# Patient Record
Sex: MALE | Race: WHITE | NOT HISPANIC OR LATINO | Employment: FULL TIME | URBAN - METROPOLITAN AREA
[De-identification: names, ages, dates, MRNs, and addresses within clinical notes are randomized per-mention and may not be internally consistent; named-entity substitution may affect disease eponyms.]

---

## 2023-01-17 ENCOUNTER — TRANSCRIBE ORDERS (OUTPATIENT)
Dept: ADMINISTRATIVE | Facility: HOSPITAL | Age: 58
End: 2023-01-17
Payer: COMMERCIAL

## 2023-01-17 DIAGNOSIS — I35.0 SEVERE AORTIC STENOSIS: Primary | ICD-10-CM

## 2023-01-19 ENCOUNTER — TRANSCRIBE ORDERS (OUTPATIENT)
Dept: ADMINISTRATIVE | Facility: HOSPITAL | Age: 58
End: 2023-01-19
Payer: COMMERCIAL

## 2023-01-19 DIAGNOSIS — I35.0 NODULAR CALCIFIC AORTIC VALVE STENOSIS: Primary | ICD-10-CM

## 2023-01-26 ENCOUNTER — HOSPITAL ENCOUNTER (OUTPATIENT)
Facility: HOSPITAL | Age: 58
Setting detail: HOSPITAL OUTPATIENT SURGERY
Discharge: HOME | End: 2023-01-26
Attending: INTERNAL MEDICINE | Admitting: INTERNAL MEDICINE
Payer: COMMERCIAL

## 2023-01-26 VITALS
OXYGEN SATURATION: 94 % | HEART RATE: 79 BPM | WEIGHT: 287 LBS | BODY MASS INDEX: 36.83 KG/M2 | RESPIRATION RATE: 18 BRPM | TEMPERATURE: 97.5 F | DIASTOLIC BLOOD PRESSURE: 90 MMHG | HEIGHT: 74 IN | SYSTOLIC BLOOD PRESSURE: 130 MMHG

## 2023-01-26 DIAGNOSIS — I35.0 AORTIC STENOSIS, SEVERE: Primary | ICD-10-CM

## 2023-01-26 DIAGNOSIS — I35.0 NODULAR CALCIFIC AORTIC VALVE STENOSIS: ICD-10-CM

## 2023-01-26 LAB
ANION GAP SERPL CALCULATED.3IONS-SCNC: 12 MMOL/L (ref 5–15)
BUN BLDA-MCNC: 22 MG/DL (ref 8–26)
BUN SERPL-MCNC: 22 MG/DL (ref 6–20)
BUN/CREAT SERPL: 29.3 (ref 7–25)
CA-I BLDA-SCNC: 1.23 MMOL/L (ref 1.2–1.32)
CALCIUM SPEC-SCNC: 9.2 MG/DL (ref 8.6–10.5)
CHLORIDE BLDA-SCNC: 101 MMOL/L (ref 98–109)
CHLORIDE SERPL-SCNC: 100 MMOL/L (ref 98–107)
CHOLEST SERPL-MCNC: 139 MG/DL (ref 0–200)
CO2 BLDA-SCNC: 26 MMOL/L (ref 24–29)
CO2 SERPL-SCNC: 25 MMOL/L (ref 22–29)
CREAT BLDA-MCNC: 0.8 MG/DL (ref 0.6–1.3)
CREAT SERPL-MCNC: 0.75 MG/DL (ref 0.76–1.27)
DEPRECATED RDW RBC AUTO: 49.3 FL (ref 37–54)
EGFRCR SERPLBLD CKD-EPI 2021: 102.6 ML/MIN/1.73
EGFRCR SERPLBLD CKD-EPI 2021: 104.6 ML/MIN/1.73
ERYTHROCYTE [DISTWIDTH] IN BLOOD BY AUTOMATED COUNT: 14.8 % (ref 12.3–15.4)
GLUCOSE BLDC GLUCOMTR-MCNC: 110 MG/DL (ref 70–130)
GLUCOSE SERPL-MCNC: 106 MG/DL (ref 65–99)
HBA1C MFR BLD: 6.1 % (ref 4.8–5.6)
HCT VFR BLD AUTO: 43 % (ref 37.5–51)
HCT VFR BLDA CALC: 45 % (ref 38–51)
HDLC SERPL-MCNC: 55 MG/DL (ref 40–60)
HGB BLD-MCNC: 14.2 G/DL (ref 13–17.7)
HGB BLDA-MCNC: 15.3 G/DL (ref 12–17)
LDLC SERPL CALC-MCNC: 73 MG/DL (ref 0–100)
LDLC/HDLC SERPL: 1.34 {RATIO}
MCH RBC QN AUTO: 30 PG (ref 26.6–33)
MCHC RBC AUTO-ENTMCNC: 33 G/DL (ref 31.5–35.7)
MCV RBC AUTO: 90.7 FL (ref 79–97)
PLATELET # BLD AUTO: 199 10*3/MM3 (ref 140–450)
PMV BLD AUTO: 9.2 FL (ref 6–12)
POTASSIUM BLDA-SCNC: 4 MMOL/L (ref 3.5–4.9)
POTASSIUM SERPL-SCNC: 4.1 MMOL/L (ref 3.5–5.2)
RBC # BLD AUTO: 4.74 10*6/MM3 (ref 4.14–5.8)
SODIUM BLD-SCNC: 138 MMOL/L (ref 138–146)
SODIUM SERPL-SCNC: 137 MMOL/L (ref 136–145)
TRIGL SERPL-MCNC: 51 MG/DL (ref 0–150)
VLDLC SERPL-MCNC: 11 MG/DL (ref 5–40)
WBC NRBC COR # BLD: 15.45 10*3/MM3 (ref 3.4–10.8)

## 2023-01-26 PROCEDURE — C1769 GUIDE WIRE: HCPCS | Performed by: INTERNAL MEDICINE

## 2023-01-26 PROCEDURE — 25010000002 FENTANYL CITRATE (PF) 50 MCG/ML SOLUTION: Performed by: INTERNAL MEDICINE

## 2023-01-26 PROCEDURE — 85027 COMPLETE CBC AUTOMATED: CPT | Performed by: INTERNAL MEDICINE

## 2023-01-26 PROCEDURE — 80048 BASIC METABOLIC PNL TOTAL CA: CPT | Performed by: INTERNAL MEDICINE

## 2023-01-26 PROCEDURE — 83036 HEMOGLOBIN GLYCOSYLATED A1C: CPT | Performed by: PHYSICIAN ASSISTANT

## 2023-01-26 PROCEDURE — 80047 BASIC METABLC PNL IONIZED CA: CPT

## 2023-01-26 PROCEDURE — 25010000002 HEPARIN (PORCINE) PER 1000 UNITS: Performed by: INTERNAL MEDICINE

## 2023-01-26 PROCEDURE — 80061 LIPID PANEL: CPT | Performed by: PHYSICIAN ASSISTANT

## 2023-01-26 PROCEDURE — 25010000002 MIDAZOLAM PER 1 MG: Performed by: INTERNAL MEDICINE

## 2023-01-26 PROCEDURE — 36415 COLL VENOUS BLD VENIPUNCTURE: CPT

## 2023-01-26 PROCEDURE — C1894 INTRO/SHEATH, NON-LASER: HCPCS | Performed by: INTERNAL MEDICINE

## 2023-01-26 PROCEDURE — 0 IOPAMIDOL PER 1 ML: Performed by: INTERNAL MEDICINE

## 2023-01-26 PROCEDURE — 85014 HEMATOCRIT: CPT

## 2023-01-26 PROCEDURE — 93454 CORONARY ARTERY ANGIO S&I: CPT | Performed by: INTERNAL MEDICINE

## 2023-01-26 RX ORDER — LIDOCAINE HYDROCHLORIDE 10 MG/ML
INJECTION, SOLUTION EPIDURAL; INFILTRATION; INTRACAUDAL; PERINEURAL
Status: DISCONTINUED | OUTPATIENT
Start: 2023-01-26 | End: 2023-01-26 | Stop reason: HOSPADM

## 2023-01-26 RX ORDER — MIDAZOLAM HYDROCHLORIDE 1 MG/ML
INJECTION INTRAMUSCULAR; INTRAVENOUS
Status: DISCONTINUED | OUTPATIENT
Start: 2023-01-26 | End: 2023-01-26 | Stop reason: HOSPADM

## 2023-01-26 RX ORDER — BISOPROLOL FUMARATE 5 MG/1
2.5 TABLET, FILM COATED ORAL NIGHTLY
COMMUNITY

## 2023-01-26 RX ORDER — NALOXONE HCL 0.4 MG/ML
0.4 VIAL (ML) INJECTION
Status: DISCONTINUED | OUTPATIENT
Start: 2023-01-26 | End: 2023-01-26 | Stop reason: HOSPADM

## 2023-01-26 RX ORDER — HYDROCODONE BITARTRATE AND ACETAMINOPHEN 5; 325 MG/1; MG/1
1 TABLET ORAL EVERY 4 HOURS PRN
Status: DISCONTINUED | OUTPATIENT
Start: 2023-01-26 | End: 2023-01-26 | Stop reason: HOSPADM

## 2023-01-26 RX ORDER — NICARDIPINE HCL-0.9% SOD CHLOR 1 MG/10 ML
SYRINGE (ML) INTRAVENOUS
Status: DISCONTINUED | OUTPATIENT
Start: 2023-01-26 | End: 2023-01-26 | Stop reason: HOSPADM

## 2023-01-26 RX ORDER — FUROSEMIDE 20 MG/1
20 TABLET ORAL DAILY
COMMUNITY

## 2023-01-26 RX ORDER — AMLODIPINE BESYLATE 5 MG/1
5 TABLET ORAL DAILY
COMMUNITY

## 2023-01-26 RX ORDER — SODIUM CHLORIDE 9 MG/ML
250 INJECTION, SOLUTION INTRAVENOUS CONTINUOUS
Status: ACTIVE | OUTPATIENT
Start: 2023-01-26 | End: 2023-01-26

## 2023-01-26 RX ORDER — HEPARIN SODIUM 1000 [USP'U]/ML
INJECTION, SOLUTION INTRAVENOUS; SUBCUTANEOUS
Status: DISCONTINUED | OUTPATIENT
Start: 2023-01-26 | End: 2023-01-26 | Stop reason: HOSPADM

## 2023-01-26 RX ORDER — ASPIRIN 81 MG/1
81 TABLET ORAL DAILY
COMMUNITY
End: 2023-03-15

## 2023-01-26 RX ORDER — TEMAZEPAM 7.5 MG/1
7.5 CAPSULE ORAL NIGHTLY PRN
Status: DISCONTINUED | OUTPATIENT
Start: 2023-01-26 | End: 2023-01-26 | Stop reason: HOSPADM

## 2023-01-26 RX ORDER — FENTANYL CITRATE 50 UG/ML
INJECTION, SOLUTION INTRAMUSCULAR; INTRAVENOUS
Status: DISCONTINUED | OUTPATIENT
Start: 2023-01-26 | End: 2023-01-26 | Stop reason: HOSPADM

## 2023-01-26 RX ORDER — VALSARTAN 80 MG/1
80 TABLET ORAL DAILY
COMMUNITY

## 2023-01-26 RX ORDER — ALPRAZOLAM 0.25 MG/1
0.25 TABLET ORAL 3 TIMES DAILY PRN
Status: DISCONTINUED | OUTPATIENT
Start: 2023-01-26 | End: 2023-01-26 | Stop reason: HOSPADM

## 2023-01-26 RX ORDER — LORATADINE 10 MG/1
10 TABLET ORAL DAILY
COMMUNITY

## 2023-01-26 RX ORDER — ACETAMINOPHEN 325 MG/1
650 TABLET ORAL EVERY 4 HOURS PRN
Status: DISCONTINUED | OUTPATIENT
Start: 2023-01-26 | End: 2023-01-26 | Stop reason: HOSPADM

## 2023-01-26 RX ORDER — ATORVASTATIN CALCIUM 40 MG/1
40 TABLET, FILM COATED ORAL NIGHTLY
COMMUNITY
End: 2023-02-24 | Stop reason: HOSPADM

## 2023-01-26 RX ORDER — MORPHINE SULFATE 2 MG/ML
1 INJECTION, SOLUTION INTRAMUSCULAR; INTRAVENOUS EVERY 4 HOURS PRN
Status: DISCONTINUED | OUTPATIENT
Start: 2023-01-26 | End: 2023-01-26 | Stop reason: HOSPADM

## 2023-01-26 RX ORDER — DEXAMETHASONE 4 MG/1
4 TABLET ORAL 2 TIMES DAILY WITH MEALS
COMMUNITY
End: 2023-02-13

## 2023-01-26 NOTE — PROGRESS NOTES
"Referral received from Dr. Guan for TAVR workup. Spoke with patient and spouse at bedside in [facility] after C. He reports recent PNA and pleural effusion causing SOA, does not reqire and supplemental oxygen. Intermittent chest fullness/pressure and he's noticed a reduction in endurance at work. He works at the [employer] and averages 300,000 steps a month, but has noticed he 'just can't keep up' anymore. Discussed severe AS, aortic valve replacement options-TAVR vs. SAVR, and  pre-procedural testing and consult requirements. He will need a dental checkup and is aware of the importance of this.     Will order CTS consult with Dr. Barger per Dr. Guan's request. He has already ordered a TEODORO Will also order TAVR CTA and carotid duplex.     Educational materials provided at bedside, which includes my contact information for any questions or concerns.       TAVR Pre-Op Checklist    Patient Name: John Wolfe   57 y.o. [id number]  Residence: [place], [place]    Referral Date: 23 : 1965   Ht:74\" Wt: 287lb     Referring Cardiologist: Dr. Guan  BMI: 36.85    Initial TTE date: 23 EXTERNAL MEDICAL RECORDS - SCAN - NOTES,ECHO/ ROMA HEART SPEC/ - (2023)    DAWIT: 0.9cm AV mean: 41mm AV Vmax: 4.2m/s LVEF: 60-65%    CT Surgery Consult:P      STS Score: P     Allergy (Contrast):Ceclor [cefaclor]   Pre-op meds (contrast): N     Anticoagulated: No Last dose: NA Heparin or Lovenox Bridge: NA    CTA Date: P     CTA 3D: P    Left Coronary Ht: P  Right Coronary Ht: P  Annulus Area: P    CTA Important findings: P    Cardiac cath: H&P by Edwin Guan MD (2023 07:11)    Physician: Dr. Guan       TEODORO: P        Physician: Dr. Mccracken   TEODORO annulus: P  Projected TAVR Prosthesis Size:     Carotid duplex: P      Dobutamine GXT:NA      EKG rhythm: P  NSR with RBBB   PPM/ Last download :NA    PFT (FEV1):P    Important meds: ASA    PAT lab review:  BNP:P BUN/Creatinine: P H&H:P       " PLTS:P    P2Y12:P HGA1C: 6.1% CXR:P      TAVR Procedure Date: P

## 2023-01-30 ENCOUNTER — PREP FOR SURGERY (OUTPATIENT)
Dept: OTHER | Facility: HOSPITAL | Age: 58
End: 2023-01-30
Payer: COMMERCIAL

## 2023-02-01 ENCOUNTER — HOSPITAL ENCOUNTER (OUTPATIENT)
Dept: CARDIOLOGY | Facility: HOSPITAL | Age: 58
Discharge: HOME | End: 2023-02-01
Admitting: INTERNAL MEDICINE
Payer: COMMERCIAL

## 2023-02-01 VITALS
BODY MASS INDEX: 36.78 KG/M2 | HEIGHT: 74 IN | SYSTOLIC BLOOD PRESSURE: 115 MMHG | HEART RATE: 61 BPM | WEIGHT: 286.6 LBS | DIASTOLIC BLOOD PRESSURE: 70 MMHG | OXYGEN SATURATION: 94 % | RESPIRATION RATE: 18 BRPM

## 2023-02-01 DIAGNOSIS — I35.0 SEVERE AORTIC STENOSIS: ICD-10-CM

## 2023-02-01 LAB
BH CV ECHO MEAS - AO MAX PG: 50.7 MMHG
BH CV ECHO MEAS - AO MEAN PG: 33 MMHG
BH CV ECHO MEAS - AO V2 MAX: 356 CM/SEC
BH CV ECHO MEAS - AO V2 VTI: 78.3 CM
BH CV ECHO MEAS - LVOT AREA: 3.5 CM2
BH CV ECHO MEAS - LVOT DIAM: 2.1 CM
BH CV VAS BP RIGHT ARM: NORMAL MMHG
LV EF 2D ECHO EST: 60 %
MAXIMAL PREDICTED HEART RATE: 162 BPM
STRESS TARGET HR: 138 BPM

## 2023-02-01 PROCEDURE — 93321 DOPPLER ECHO F-UP/LMTD STD: CPT | Performed by: INTERNAL MEDICINE

## 2023-02-01 PROCEDURE — 93312 ECHO TRANSESOPHAGEAL: CPT | Performed by: INTERNAL MEDICINE

## 2023-02-01 PROCEDURE — 93325 DOPPLER ECHO COLOR FLOW MAPG: CPT | Performed by: INTERNAL MEDICINE

## 2023-02-01 PROCEDURE — 25010000002 MIDAZOLAM PER 1 MG: Performed by: INTERNAL MEDICINE

## 2023-02-01 PROCEDURE — 93312 ECHO TRANSESOPHAGEAL: CPT

## 2023-02-01 PROCEDURE — 93321 DOPPLER ECHO F-UP/LMTD STD: CPT

## 2023-02-01 PROCEDURE — 93325 DOPPLER ECHO COLOR FLOW MAPG: CPT

## 2023-02-01 RX ORDER — POTASSIUM CHLORIDE 750 MG/1
1 CAPSULE, EXTENDED RELEASE ORAL DAILY
COMMUNITY
Start: 2023-01-11 | End: 2023-03-15

## 2023-02-01 RX ORDER — MIDAZOLAM HYDROCHLORIDE 1 MG/ML
INJECTION INTRAMUSCULAR; INTRAVENOUS
Status: COMPLETED | OUTPATIENT
Start: 2023-02-01 | End: 2023-02-01

## 2023-02-01 RX ADMIN — MIDAZOLAM HYDROCHLORIDE 2 MG: 1 INJECTION, SOLUTION INTRAMUSCULAR; INTRAVENOUS at 09:18

## 2023-02-01 RX ADMIN — MIDAZOLAM HYDROCHLORIDE 2 MG: 1 INJECTION, SOLUTION INTRAMUSCULAR; INTRAVENOUS at 09:14

## 2023-02-01 NOTE — INTERVAL H&P NOTE
H&P reviewed. The patient was examined and there are no changes to the H&P.      Patient with severe aortic stenosis on transthoracic echocardiogram presents today for TEODORO as per the work-up for consideration of TAVR.  The risks, benefits, and alternatives were discussed with the patient he wishes to proceed.    Nikia Argueta PA-C

## 2023-02-03 ENCOUNTER — HOSPITAL ENCOUNTER (OUTPATIENT)
Dept: CARDIOLOGY | Facility: HOSPITAL | Age: 58
Discharge: HOME | End: 2023-02-03
Admitting: INTERNAL MEDICINE
Payer: COMMERCIAL

## 2023-02-03 VITALS — BODY MASS INDEX: 36.78 KG/M2 | HEIGHT: 74 IN | WEIGHT: 286.6 LBS

## 2023-02-03 DIAGNOSIS — I35.0 AORTIC STENOSIS, SEVERE: ICD-10-CM

## 2023-02-03 PROCEDURE — 93880 EXTRACRANIAL BILAT STUDY: CPT

## 2023-02-06 LAB
BH CV XLRA MEAS LEFT DIST CCA EDV: 20.9 CM/SEC
BH CV XLRA MEAS LEFT DIST CCA PSV: 71.9 CM/SEC
BH CV XLRA MEAS LEFT DIST ICA EDV: 15.7 CM/SEC
BH CV XLRA MEAS LEFT DIST ICA PSV: 50.1 CM/SEC
BH CV XLRA MEAS LEFT ICA/CCA RATIO: 1.06
BH CV XLRA MEAS LEFT MID CCA EDV: 13.7 CM/SEC
BH CV XLRA MEAS LEFT MID CCA PSV: 72.4 CM/SEC
BH CV XLRA MEAS LEFT MID ICA EDV: 35.4 CM/SEC
BH CV XLRA MEAS LEFT MID ICA PSV: 77 CM/SEC
BH CV XLRA MEAS LEFT PROX CCA EDV: 21.4 CM/SEC
BH CV XLRA MEAS LEFT PROX CCA PSV: 92.7 CM/SEC
BH CV XLRA MEAS LEFT PROX ECA EDV: 36.8 CM/SEC
BH CV XLRA MEAS LEFT PROX ECA PSV: 137 CM/SEC
BH CV XLRA MEAS LEFT PROX ICA EDV: 24.1 CM/SEC
BH CV XLRA MEAS LEFT PROX ICA PSV: 48.7 CM/SEC
BH CV XLRA MEAS LEFT PROX SCLA PSV: 175.3 CM/SEC
BH CV XLRA MEAS RIGHT DIST CCA EDV: 19.2 CM/SEC
BH CV XLRA MEAS RIGHT DIST CCA PSV: 79 CM/SEC
BH CV XLRA MEAS RIGHT DIST ICA EDV: 28 CM/SEC
BH CV XLRA MEAS RIGHT DIST ICA PSV: 67.5 CM/SEC
BH CV XLRA MEAS RIGHT ICA/CCA RATIO: 0.8
BH CV XLRA MEAS RIGHT MID CCA EDV: 27.4 CM/SEC
BH CV XLRA MEAS RIGHT MID CCA PSV: 83.9 CM/SEC
BH CV XLRA MEAS RIGHT MID ICA EDV: 22.5 CM/SEC
BH CV XLRA MEAS RIGHT MID ICA PSV: 54.9 CM/SEC
BH CV XLRA MEAS RIGHT PROX CCA EDV: 10.2 CM/SEC
BH CV XLRA MEAS RIGHT PROX CCA PSV: 63.5 CM/SEC
BH CV XLRA MEAS RIGHT PROX ECA EDV: 15.4 CM/SEC
BH CV XLRA MEAS RIGHT PROX ECA PSV: 53.8 CM/SEC
BH CV XLRA MEAS RIGHT PROX ICA EDV: 16.5 CM/SEC
BH CV XLRA MEAS RIGHT PROX ICA PSV: 62.6 CM/SEC
BH CV XLRA MEAS RIGHT PROX SCLA PSV: 87 CM/SEC
BH CV XLRA MEAS RIGHT VERTEBRAL A EDV: 15.9 CM/SEC
BH CV XLRA MEAS RIGHT VERTEBRAL A PSV: 48.8 CM/SEC
LEFT ARM BP: NORMAL MMHG
MAXIMAL PREDICTED HEART RATE: 162 BPM
RIGHT ARM BP: NORMAL MMHG
STRESS TARGET HR: 138 BPM

## 2023-02-13 ENCOUNTER — HOSPITAL ENCOUNTER (OUTPATIENT)
Dept: CT IMAGING | Facility: HOSPITAL | Age: 58
Discharge: HOME | End: 2023-02-13
Admitting: INTERNAL MEDICINE
Payer: COMMERCIAL

## 2023-02-13 VITALS
SYSTOLIC BLOOD PRESSURE: 133 MMHG | OXYGEN SATURATION: 94 % | DIASTOLIC BLOOD PRESSURE: 91 MMHG | HEIGHT: 74 IN | TEMPERATURE: 97.2 F | BODY MASS INDEX: 37.86 KG/M2 | HEART RATE: 83 BPM | WEIGHT: 295 LBS

## 2023-02-13 DIAGNOSIS — I35.0 NODULAR CALCIFIC AORTIC VALVE STENOSIS: Primary | ICD-10-CM

## 2023-02-13 DIAGNOSIS — I35.0 AORTIC STENOSIS, SEVERE: ICD-10-CM

## 2023-02-13 PROCEDURE — 74174 CTA ABD&PLVS W/CONTRAST: CPT

## 2023-02-13 PROCEDURE — 0 IOPAMIDOL PER 1 ML: Performed by: INTERNAL MEDICINE

## 2023-02-13 PROCEDURE — 71275 CT ANGIOGRAPHY CHEST: CPT

## 2023-02-13 RX ORDER — SODIUM CHLORIDE 0.9 % (FLUSH) 0.9 %
10 SYRINGE (ML) INJECTION AS NEEDED
Status: DISCONTINUED | OUTPATIENT
Start: 2023-02-13 | End: 2023-02-14 | Stop reason: HOSPADM

## 2023-02-13 RX ORDER — METOPROLOL TARTRATE 50 MG/1
50 TABLET, FILM COATED ORAL ONCE AS NEEDED
Status: COMPLETED | OUTPATIENT
Start: 2023-02-13 | End: 2023-02-13

## 2023-02-13 RX ORDER — SODIUM CHLORIDE 0.9 % (FLUSH) 0.9 %
3 SYRINGE (ML) INJECTION EVERY 12 HOURS SCHEDULED
Status: DISCONTINUED | OUTPATIENT
Start: 2023-02-13 | End: 2023-02-14 | Stop reason: HOSPADM

## 2023-02-13 RX ORDER — METOPROLOL TARTRATE 50 MG/1
100 TABLET, FILM COATED ORAL ONCE AS NEEDED
Status: COMPLETED | OUTPATIENT
Start: 2023-02-13 | End: 2023-02-13

## 2023-02-13 RX ADMIN — METOPROLOL TARTRATE 100 MG: 50 TABLET, FILM COATED ORAL at 12:14

## 2023-02-13 RX ADMIN — IOPAMIDOL 100 ML: 755 INJECTION, SOLUTION INTRAVENOUS at 13:24

## 2023-02-13 NOTE — PROGRESS NOTES
Referral received from Dr. Barger for aortic stenosis. Currently, he does not meet criteria for SAVR or TAVR. DAWIT: 1.0cm2, Vmax 283cm/sec, mean 19mmHg. He does have symptoms of chest discomfort with exertion, leg fatigue and dizziness. He denies syncope. He does have a stressful/demanding job and reports blood pressure as high as 170/100 at times. He was taken off of his Lisinopril, but does take his amlodipine and HCTZ. Does not take nitroglycerin for chest discomfort. Discussed aortic stenosis, bicuspid valve, s/s, when to go to ED and/or contact local cardiology providers and future valve replacement options. Referral placed for patient to see Dr. Mccracken, per Dr. Barger. Educational materials provided and I have updated Dr. Victor and Silvia Charles, APRN.

## 2023-02-14 PROBLEM — E11.9 DM (DIABETES MELLITUS), TYPE 2: Status: ACTIVE | Noted: 2023-02-14

## 2023-02-14 PROBLEM — E78.5 HYPERLIPIDEMIA: Status: ACTIVE | Noted: 2023-02-14

## 2023-02-14 PROBLEM — I10 ESSENTIAL HYPERTENSION: Status: ACTIVE | Noted: 2023-02-14

## 2023-02-14 PROBLEM — I51.89 GRADE II DIASTOLIC DYSFUNCTION: Status: ACTIVE | Noted: 2023-02-14

## 2023-02-16 ENCOUNTER — PREP FOR SURGERY (OUTPATIENT)
Dept: OTHER | Facility: HOSPITAL | Age: 58
End: 2023-02-16
Payer: COMMERCIAL

## 2023-02-16 DIAGNOSIS — I35.9 AORTIC VALVE DISORDER: Primary | ICD-10-CM

## 2023-02-20 ENCOUNTER — OFFICE VISIT (OUTPATIENT)
Dept: CARDIAC SURGERY | Facility: CLINIC | Age: 58
End: 2023-02-20
Payer: COMMERCIAL

## 2023-02-20 ENCOUNTER — DOCUMENTATION (OUTPATIENT)
Dept: CARDIOLOGY | Facility: HOSPITAL | Age: 58
End: 2023-02-20
Payer: COMMERCIAL

## 2023-02-20 ENCOUNTER — HOSPITAL ENCOUNTER (OUTPATIENT)
Dept: GENERAL RADIOLOGY | Facility: HOSPITAL | Age: 58
Discharge: HOME | DRG: 267 | End: 2023-02-20
Payer: COMMERCIAL

## 2023-02-20 ENCOUNTER — PRE-ADMISSION TESTING (OUTPATIENT)
Dept: PREADMISSION TESTING | Facility: HOSPITAL | Age: 58
DRG: 267 | End: 2023-02-20
Payer: COMMERCIAL

## 2023-02-20 ENCOUNTER — HOSPITAL ENCOUNTER (OUTPATIENT)
Dept: PULMONOLOGY | Facility: HOSPITAL | Age: 58
Discharge: HOME | DRG: 267 | End: 2023-02-20
Payer: COMMERCIAL

## 2023-02-20 VITALS
DIASTOLIC BLOOD PRESSURE: 90 MMHG | TEMPERATURE: 97.8 F | BODY MASS INDEX: 37.99 KG/M2 | HEIGHT: 74 IN | SYSTOLIC BLOOD PRESSURE: 120 MMHG | WEIGHT: 296 LBS | OXYGEN SATURATION: 92 % | HEART RATE: 84 BPM

## 2023-02-20 VITALS — HEIGHT: 74 IN | BODY MASS INDEX: 38.03 KG/M2 | WEIGHT: 296.3 LBS

## 2023-02-20 DIAGNOSIS — I35.9 AORTIC VALVE DISORDER: ICD-10-CM

## 2023-02-20 DIAGNOSIS — I35.9 AORTIC VALVE DISORDER: Primary | ICD-10-CM

## 2023-02-20 LAB
ABO GROUP BLD: NORMAL
ALBUMIN SERPL-MCNC: 4.5 G/DL (ref 3.5–5.2)
ALBUMIN/GLOB SERPL: 1.4 G/DL
ALP SERPL-CCNC: 51 U/L (ref 39–117)
ALT SERPL W P-5'-P-CCNC: 52 U/L (ref 1–41)
AMPHET+METHAMPHET UR QL: NEGATIVE
AMPHETAMINES UR QL: NEGATIVE
ANION GAP SERPL CALCULATED.3IONS-SCNC: 12 MMOL/L (ref 5–15)
APTT PPP: 30.7 SECONDS (ref 22–39)
AST SERPL-CCNC: 33 U/L (ref 1–40)
BARBITURATES UR QL SCN: NEGATIVE
BASOPHILS # BLD AUTO: 0.06 10*3/MM3 (ref 0–0.2)
BASOPHILS NFR BLD AUTO: 0.8 % (ref 0–1.5)
BENZODIAZ UR QL SCN: NEGATIVE
BILIRUB SERPL-MCNC: 0.3 MG/DL (ref 0–1.2)
BLD GP AB SCN SERPL QL: NEGATIVE
BUN SERPL-MCNC: 16 MG/DL (ref 6–20)
BUN/CREAT SERPL: 18.8 (ref 7–25)
BUPRENORPHINE SERPL-MCNC: NEGATIVE NG/ML
CALCIUM SPEC-SCNC: 9.5 MG/DL (ref 8.6–10.5)
CANNABINOIDS SERPL QL: NEGATIVE
CHLORIDE SERPL-SCNC: 100 MMOL/L (ref 98–107)
CO2 SERPL-SCNC: 29 MMOL/L (ref 22–29)
COCAINE UR QL: NEGATIVE
CREAT SERPL-MCNC: 0.85 MG/DL (ref 0.76–1.27)
DEPRECATED RDW RBC AUTO: 45.1 FL (ref 37–54)
EGFRCR SERPLBLD CKD-EPI 2021: 100.7 ML/MIN/1.73
EOSINOPHIL # BLD AUTO: 0.32 10*3/MM3 (ref 0–0.4)
EOSINOPHIL NFR BLD AUTO: 4 % (ref 0.3–6.2)
ERYTHROCYTE [DISTWIDTH] IN BLOOD BY AUTOMATED COUNT: 14 % (ref 12.3–15.4)
GLOBULIN UR ELPH-MCNC: 3.2 GM/DL
GLUCOSE SERPL-MCNC: 122 MG/DL (ref 65–99)
HBA1C MFR BLD: 6.4 % (ref 4.8–5.6)
HCT VFR BLD AUTO: 43.4 % (ref 37.5–51)
HGB BLD-MCNC: 14.8 G/DL (ref 13–17.7)
IMM GRANULOCYTES # BLD AUTO: 0.04 10*3/MM3 (ref 0–0.05)
IMM GRANULOCYTES NFR BLD AUTO: 0.5 % (ref 0–0.5)
INR PPP: 1.05 (ref 0.84–1.13)
LYMPHOCYTES # BLD AUTO: 2.3 10*3/MM3 (ref 0.7–3.1)
LYMPHOCYTES NFR BLD AUTO: 28.8 % (ref 19.6–45.3)
MAGNESIUM SERPL-MCNC: 2 MG/DL (ref 1.6–2.6)
MCH RBC QN AUTO: 30.4 PG (ref 26.6–33)
MCHC RBC AUTO-ENTMCNC: 34.1 G/DL (ref 31.5–35.7)
MCV RBC AUTO: 89.1 FL (ref 79–97)
METHADONE UR QL SCN: NEGATIVE
MONOCYTES # BLD AUTO: 0.66 10*3/MM3 (ref 0.1–0.9)
MONOCYTES NFR BLD AUTO: 8.3 % (ref 5–12)
NEUTROPHILS NFR BLD AUTO: 4.6 10*3/MM3 (ref 1.7–7)
NEUTROPHILS NFR BLD AUTO: 57.6 % (ref 42.7–76)
NRBC BLD AUTO-RTO: 0 /100 WBC (ref 0–0.2)
OPIATES UR QL: NEGATIVE
OXYCODONE UR QL SCN: NEGATIVE
PA ADP PRP-ACNC: 183 PRU
PCP UR QL SCN: NEGATIVE
PLATELET # BLD AUTO: 247 10*3/MM3 (ref 140–450)
PMV BLD AUTO: 8.6 FL (ref 6–12)
POTASSIUM SERPL-SCNC: 3.5 MMOL/L (ref 3.5–5.2)
PROPOXYPH UR QL: NEGATIVE
PROT SERPL-MCNC: 7.7 G/DL (ref 6–8.5)
PROTHROMBIN TIME: 13.6 SECONDS (ref 11.4–14.4)
RBC # BLD AUTO: 4.87 10*6/MM3 (ref 4.14–5.8)
RH BLD: POSITIVE
SODIUM SERPL-SCNC: 141 MMOL/L (ref 136–145)
T&S EXPIRATION DATE: NORMAL
TRICYCLICS UR QL SCN: NEGATIVE
WBC NRBC COR # BLD: 7.98 10*3/MM3 (ref 3.4–10.8)

## 2023-02-20 PROCEDURE — 99205 OFFICE O/P NEW HI 60 MIN: CPT | Performed by: THORACIC SURGERY (CARDIOTHORACIC VASCULAR SURGERY)

## 2023-02-20 PROCEDURE — 86900 BLOOD TYPING SEROLOGIC ABO: CPT

## 2023-02-20 PROCEDURE — 71046 X-RAY EXAM CHEST 2 VIEWS: CPT

## 2023-02-20 PROCEDURE — 83036 HEMOGLOBIN GLYCOSYLATED A1C: CPT

## 2023-02-20 PROCEDURE — 80053 COMPREHEN METABOLIC PANEL: CPT

## 2023-02-20 PROCEDURE — 94010 BREATHING CAPACITY TEST: CPT | Performed by: INTERNAL MEDICINE

## 2023-02-20 PROCEDURE — 93005 ELECTROCARDIOGRAM TRACING: CPT

## 2023-02-20 PROCEDURE — 36415 COLL VENOUS BLD VENIPUNCTURE: CPT

## 2023-02-20 PROCEDURE — 93010 ELECTROCARDIOGRAM REPORT: CPT | Performed by: INTERNAL MEDICINE

## 2023-02-20 PROCEDURE — 83735 ASSAY OF MAGNESIUM: CPT

## 2023-02-20 PROCEDURE — 86850 RBC ANTIBODY SCREEN: CPT

## 2023-02-20 PROCEDURE — 85576 BLOOD PLATELET AGGREGATION: CPT

## 2023-02-20 PROCEDURE — 85730 THROMBOPLASTIN TIME PARTIAL: CPT

## 2023-02-20 PROCEDURE — 80306 DRUG TEST PRSMV INSTRMNT: CPT

## 2023-02-20 PROCEDURE — 85610 PROTHROMBIN TIME: CPT

## 2023-02-20 PROCEDURE — 86923 COMPATIBILITY TEST ELECTRIC: CPT

## 2023-02-20 PROCEDURE — 94010 BREATHING CAPACITY TEST: CPT

## 2023-02-20 PROCEDURE — 86901 BLOOD TYPING SEROLOGIC RH(D): CPT

## 2023-02-20 PROCEDURE — 85025 COMPLETE CBC W/AUTO DIFF WBC: CPT

## 2023-02-20 RX ORDER — CHLORHEXIDINE GLUCONATE 500 MG/1
1 CLOTH TOPICAL EVERY 12 HOURS PRN
Status: CANCELLED | OUTPATIENT
Start: 2023-02-20

## 2023-02-20 RX ORDER — NITROGLYCERIN 0.4 MG/1
0.4 TABLET SUBLINGUAL
Status: CANCELLED | OUTPATIENT
Start: 2023-02-20

## 2023-02-20 RX ORDER — ASPIRIN 325 MG
325 TABLET ORAL NIGHTLY
Status: CANCELLED | OUTPATIENT
Start: 2023-02-20 | End: 2023-02-21

## 2023-02-20 RX ORDER — OMEPRAZOLE 20 MG/1
20 CAPSULE, DELAYED RELEASE ORAL DAILY
COMMUNITY
End: 2023-03-15

## 2023-02-20 RX ORDER — CHLORHEXIDINE GLUCONATE 500 MG/1
1 CLOTH TOPICAL EVERY 12 HOURS PRN
Status: ACTIVE | OUTPATIENT
Start: 2023-02-20

## 2023-02-20 RX ORDER — ASPIRIN 325 MG
325 TABLET ORAL NIGHTLY
Status: SHIPPED | OUTPATIENT
Start: 2023-02-20 | End: 2023-02-21

## 2023-02-20 RX ORDER — CHLORHEXIDINE GLUCONATE 0.12 MG/ML
15 RINSE ORAL ONCE
Status: CANCELLED | OUTPATIENT
Start: 2023-02-20 | End: 2023-02-20

## 2023-02-20 NOTE — PROGRESS NOTES
Met with patient after CTS apt for KCCQ, 5 meter walk test and discuss TAVR on 2/23. Has been feeling about the same as when we last discussed his s/s after [facility]. Continued MONTOYA and reduced exercise capacity. Is anticipating TAVR on Thursday so he can return to work and be able to walk 30,000-40,000 steps/day. Discussed TAVR procedure, testing results, overnight hospitalization, risks associated with procedure and follow up expectations. Will follow up after TAVR.      KCCQ: 30/70  5MWT: 5.49s/5 meters

## 2023-02-20 NOTE — PAT
Patient viewed general PAT education video as instructed in their preoperative information received from their surgeon.  Patient stated the general PAT education video was viewed in its entirety and survey completed.  Copies of [facility] general education handouts (Incentive Spirometry, Meds to Beds Program, Patient Belongings, Pre-op skin preparation instructions, Blood Glucose testing, Visitor policy, Surgery FAQ, Code H) distributed to patient if not printed. Education related to the PAT pass and skin preparation for surgery (if applicable) completed in [facility] as a reinforcement to PAT education video. Patient instructed to return PAT pass provided today as well as completed skin preparation sheet (if applicable) on the day of procedure.     Additionally if patient had not viewed video yet but intended to view it at home or in our waiting area, then referred them to the handout with QR code/link provided during PAT visit.  Instructed patient to complete survey after viewing the video in its entirety.  Encouraged patient/family to read [facility] general education handouts thoroughly and notify PAT staff with any questions or concerns. Patient verbalized understanding of all information and priority content.    Blood bank bracelet applied to patient during Pre Admission Testing visit.  Patient instructed not to remove from arm until after procedure and they are discharged from the hospital.  Explained to patient that they may shower and get the bracelet wet, but not to immerse under water for longer periods (bathing, swimming, hand dishwashing, etc).  Patient verbalized understanding.    Patient to apply Chlorhexadine wipes  to surgical area (as instructed) the night before procedure and the AM of procedure. Wipes provided.    Patient instructed to drink 20 ounces of Gatorade and it needs to be completed 1 hour (for Main OR patients) or 2 hours (scheduled  section & [facility] patients) before given arrival time for  procedure (NO RED Gatorade)    Patient verbalized understanding.    Per Anesthesia Request, patient instructed not to take their ACE/ARB medications on the AM of surgery.    Instructed patient to take 325 mg of Asprin (or four tablets of the 81 mg strength) the night before heart surgery as per CT surgeon's order.  Patient and/or family verbalized understanding.    Bactroban and instructions for use given to pt.     Patient directed to Radiology Department for CXR after Pre Admission Testing Appointment.     Patient directed to Respiratory Department after Pre Admission Testing visit for Pulmonary Function Test.

## 2023-02-20 NOTE — PROGRESS NOTES
02/20/2023  Patient Information  John Wolfe                                                                                          [address]  [place] [address]   1965  'PCP/Referring Physician'  Joe Kaminski MD  [phone]  Deborah Phillips, APRN  [phone]  Chief Complaint   Patient presents with   • Consult     NP per Dr. Guan for TAVR Consult for Aortic Valve Stenosis-Complains of SOB       History of Present Illness:   The patient is a 57-year-old male everyday smoker who is referred for aortic valve stenosis. 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  Recently, he exhibited shortness of breath.  He has been short of breath since that time.  He was seen in ED at [place] and had an echocardiogram, which revealed a V-max of 420 cm second.  He had evidence of severe aortic stenosis.  He has been evaluated by Dr. Guan and had a cardiac catheterization as well as a TEODOOR.  This confirms severe aortic stenosis.  He has noncritical coronary artery disease and has been referred to me for my opinion in regards to TAVR.      Patient Active Problem List   Diagnosis   • Nodular calcific aortic valve stenosis   • Essential hypertension   • DM (diabetes mellitus), type 2 (HCC)   • Grade II diastolic dysfunction   • Hyperlipidemia   • Aortic valve disorder     Past Medical History:   Diagnosis Date   • Aortic valve stenosis    • Diabetes mellitus (HCC)    • GERD (gastroesophageal reflux disease)    • Heart murmur    • Hyperlipidemia    • Hypertension      Past Surgical History:   Procedure Laterality Date   • CARDIAC CATHETERIZATION N/A 01/26/2023    Procedure: Left Heart Cath;  Surgeon: Edwin Guan MD;  Location: [facility] CATH INVASIVE LOCATION;  Service: Cardiology;  Laterality: N/A;   • CHOLECYSTECTOMY     • NASAL SEPTAL RECONSTRUCTION         Current Outpatient Medications:   •  amLODIPine (NORVASC) 5 MG tablet, Take 5 mg by mouth Daily.,  Disp: , Rfl:   •  aspirin 81 MG EC tablet, Take 81 mg by mouth Daily., Disp: , Rfl:   •  atorvastatin (LIPITOR) 40 MG tablet, Take 40 mg by mouth Every Night., Disp: , Rfl:   •  bisoprolol (ZEBeta) 5 MG tablet, Take 2.5 mg by mouth Every Night., Disp: , Rfl:   •  furosemide (LASIX) 40 MG tablet, Take 40 mg by mouth Daily., Disp: , Rfl:   •  loratadine (CLARITIN) 10 MG tablet, Take 10 mg by mouth Daily., Disp: , Rfl:   •  omeprazole (priLOSEC) 20 MG capsule, Take 20 mg by mouth Daily., Disp: , Rfl:   •  valsartan (DIOVAN) 80 MG tablet, Take 80 mg by mouth Daily., Disp: , Rfl:   •  potassium chloride (MICRO-K) 10 MEQ CR capsule, Take 1 capsule by mouth Daily., Disp: , Rfl:   No current facility-administered medications for this visit.    Facility-Administered Medications Ordered in Other Visits:   •  aspirin tablet 325 mg, 325 mg, Oral, Nightly, Will, Maura Lela, APRN  •  Chlorhexidine Gluconate Cloth 2 % pads 1 application, 1 application, Topical, Q12H PRN, Will Maura Lela, APRN  •  mupirocin (BACTROBAN) 2 % nasal ointment 1 application, 1 application, Each Nare, Q12H, Will, Maura Lela, APRN  Allergies   Allergen Reactions   • Lidocaine Hcl-Sodium Chloride Hives   • Ceclor [Cefaclor] Rash     Social History     Socioeconomic History   • Marital status: Significant Other   • Number of children: 1   Tobacco Use   • Smoking status: Every Day     Packs/day: 1.00     Years: 35.00     Pack years: 35.00     Types: Cigarettes   • Smokeless tobacco: Never   Vaping Use   • Vaping Use: Every day   • Substances: Nicotine, Flavoring   • Devices: Refillable tank   Substance and Sexual Activity   • Alcohol use: Yes     Comment: socially   • Drug use: Never   • Sexual activity: Defer     Family History   Problem Relation Age of Onset   • Hypertension Mother    • Hyperlipidemia Mother    • Esophageal cancer Mother    • Stroke Father    • Hypertension Father    • Hyperlipidemia Father      Review of Systems  "  Constitutional: Positive for weight gain. Negative for chills, fever, malaise/fatigue, night sweats and weight loss.   HENT: Negative for hearing loss, odynophagia and sore throat.    Cardiovascular: Positive for dyspnea on exertion and leg swelling. Negative for chest pain, orthopnea and palpitations.   Respiratory: Positive for cough and shortness of breath. Negative for hemoptysis.    Endocrine: Negative for cold intolerance, heat intolerance, polydipsia, polyphagia and polyuria.   Hematologic/Lymphatic: Does not bruise/bleed easily.   Skin: Negative for itching and rash.   Musculoskeletal: Negative for joint pain, joint swelling and myalgias.   Gastrointestinal: Negative for abdominal pain, constipation, diarrhea, hematemesis, hematochezia, melena, nausea and vomiting.   Genitourinary: Negative for dysuria, frequency and hematuria.   Neurological: Negative for focal weakness, headaches, numbness and seizures.   Psychiatric/Behavioral: Negative for suicidal ideas.   Allergic/Immunologic: Positive for environmental allergies.   All other systems reviewed and are negative.    Vitals:    02/20/23 1201   BP: 120/90   BP Location: Right arm   Patient Position: Sitting   Pulse: 84   Temp: 97.8 °F (36.6 °C)   SpO2: 92%   Weight: 134 kg (296 lb)   Height: 188 cm (74\")      Physical Exam  Vitals and nursing note reviewed.   Constitutional:       General: He is not in acute distress.     Appearance: He is well-developed.   HENT:      Head: Normocephalic.   Eyes:      Conjunctiva/sclera: Conjunctivae normal.      Pupils: Pupils are equal, round, and reactive to light.   Neck:      Thyroid: No thyroid mass or thyromegaly.   Cardiovascular:      Rate and Rhythm: Normal rate.      Heart sounds: Murmur heard.     No friction rub. No gallop.   Pulmonary:      Breath sounds: No wheezing, rhonchi or rales.   Abdominal:      General: Bowel sounds are normal. There is no distension.      Palpations: Abdomen is soft. There is no " mass.      Tenderness: There is no abdominal tenderness.   Musculoskeletal:         General: No deformity. Normal range of motion.      Cervical back: Normal range of motion.   Skin:     Findings: No petechiae.      Nails: There is no clubbing.   Neurological:      Mental Status: He is oriented to person, place, and time.      Cranial Nerves: No cranial nerve deficit.      Sensory: No sensory deficit.   Psychiatric:         Behavior: Behavior normal.         The ROS, past medical history, surgical history, family history, social history and vitals were reviewed by myself and corrected as needed.      Labs/Imaging:  I have obtained and reviewed the medical records from Dr. Guan, including the echocardiogram demonstrating severe aortic stenosis and the cardiac catheterization demonstrating noncritical coronary artery disease.     Assessment/Plan:   The patient is a 57-year-old  male everyday smoker who has been referred for evaluation of TAVR.  He has severe aortic stenosis. He has a V-max of 420 cm a second.  His cardiac catheterization reveals noncritical coronary artery disease.  I personally reviewed the cardiac catheterization and concur with that.  He has about 60 to 70% right ventricular branch stenosis, which is the only critical lesion of view.  Certainly, this should not be a problem with the TAVR.  I think his symptoms are related to his aortic valve. The patient is an everyday smoker who smokes 1 pk/day for 35 years. I have spent 3 to 5 minutes talking to him about the importance of smoking cessation and the consequences thereof.  He appears to be motivated to try to stop smoking.  I have encouraged him to follow-up with his primary care provider in regards to treatment.  He has no further questions in regards to this. I explained the TAVR, risks, and alternatives in detail with him.  He appears to be fully informed and desires to proceed. We discussed an advance directive, which the patient  does not have at this time. I would like to thank you for this consultation.    Patient Active Problem List   Diagnosis   • Nodular calcific aortic valve stenosis   • Essential hypertension   • DM (diabetes mellitus), type 2 (HCC)   • Grade II diastolic dysfunction   • Hyperlipidemia   • Aortic valve disorder       CC: KYLAH Lamar editing for Clemente Barger MD    I, Clemente Barger MD, have read and agree with the editing done by Monse Watts, .

## 2023-02-21 PROBLEM — I35.9 AORTIC VALVE DISORDER: Status: ACTIVE | Noted: 2023-02-21

## 2023-02-22 ENCOUNTER — ANESTHESIA EVENT (OUTPATIENT)
Dept: PERIOP | Facility: HOSPITAL | Age: 58
DRG: 267 | End: 2023-02-22
Payer: COMMERCIAL

## 2023-02-22 LAB
QT INTERVAL: 424 MS
QTC INTERVAL: 515 MS

## 2023-02-22 RX ORDER — FAMOTIDINE 10 MG/ML
20 INJECTION, SOLUTION INTRAVENOUS ONCE
Status: CANCELLED | OUTPATIENT
Start: 2023-02-22 | End: 2023-02-22

## 2023-02-22 NOTE — ANESTHESIA PREPROCEDURE EVALUATION
Anesthesia Evaluation     Patient summary reviewed and Nursing notes reviewed   no history of anesthetic complications:  NPO Solid Status: > 8 hours  NPO Liquid Status: > 2 hours           Airway   Mallampati: II  TM distance: >3 FB  Neck ROM: full  Possible difficult intubation  Dental - normal exam     Pulmonary - negative pulmonary ROS and normal exam   Cardiovascular     ECG reviewed  Rhythm: regular  Rate: normal    (+) hypertension, valvular problems/murmurs AS and murmur, MONTOYA, murmur, hyperlipidemia,     ROS comment: Echo 2/1/23     •  Left ventricular systolic function is normal. Estimated left ventricular EF = 60%  •  Severe aortic valve stenosis is present.  Aortic annulus 2.51 cm, STJ 3.46 cm.  •  Mild mitral regurgitation      Cath: moderate non obstructive CAD    Neuro/Psych- negative ROS  GI/Hepatic/Renal/Endo    (+) obesity,  GERD,  diabetes mellitus,   (-) liver disease, no renal disease, no thyroid disorder    Musculoskeletal     Abdominal    Substance History      OB/GYN          Other                      Anesthesia Plan    ASA 4     general and Neva     intravenous induction     Anesthetic plan, risks, benefits, and alternatives have been provided, discussed and informed consent has been obtained with: patient.    Plan discussed with CRNA.        CODE STATUS:

## 2023-02-23 ENCOUNTER — ANCILLARY PROCEDURE (OUTPATIENT)
Dept: PERIOP | Facility: HOSPITAL | Age: 58
DRG: 267 | End: 2023-02-23
Payer: COMMERCIAL

## 2023-02-23 ENCOUNTER — ANESTHESIA (OUTPATIENT)
Dept: PERIOP | Facility: HOSPITAL | Age: 58
DRG: 267 | End: 2023-02-23
Payer: COMMERCIAL

## 2023-02-23 ENCOUNTER — ANESTHESIA EVENT CONVERTED (OUTPATIENT)
Dept: ANESTHESIOLOGY | Facility: HOSPITAL | Age: 58
DRG: 267 | End: 2023-02-23
Payer: COMMERCIAL

## 2023-02-23 ENCOUNTER — HOSPITAL ENCOUNTER (INPATIENT)
Facility: HOSPITAL | Age: 58
LOS: 1 days | Discharge: HOME | DRG: 267 | End: 2023-02-24
Attending: THORACIC SURGERY (CARDIOTHORACIC VASCULAR SURGERY) | Admitting: THORACIC SURGERY (CARDIOTHORACIC VASCULAR SURGERY)
Payer: COMMERCIAL

## 2023-02-23 DIAGNOSIS — I35.0 AORTIC STENOSIS, SEVERE: ICD-10-CM

## 2023-02-23 DIAGNOSIS — I35.9 AORTIC VALVE DISORDER: ICD-10-CM

## 2023-02-23 DIAGNOSIS — R09.89 SUSPECTED CEREBROVASCULAR ACCIDENT (CVA): ICD-10-CM

## 2023-02-23 DIAGNOSIS — I35.0 NODULAR CALCIFIC AORTIC VALVE STENOSIS: Primary | ICD-10-CM

## 2023-02-23 DIAGNOSIS — I51.89 GRADE II DIASTOLIC DYSFUNCTION: ICD-10-CM

## 2023-02-23 DIAGNOSIS — I35.0 NODULAR CALCIFIC AORTIC VALVE STENOSIS: ICD-10-CM

## 2023-02-23 PROBLEM — E66.9 CLASS 2 OBESITY IN ADULT: Status: ACTIVE | Noted: 2023-02-23

## 2023-02-23 PROBLEM — K21.9 GERD (GASTROESOPHAGEAL REFLUX DISEASE): Chronic | Status: ACTIVE | Noted: 2023-02-23

## 2023-02-23 PROBLEM — E78.5 HYPERLIPIDEMIA: Chronic | Status: ACTIVE | Noted: 2023-02-14

## 2023-02-23 PROBLEM — Z72.0 TOBACCO USE: Status: ACTIVE | Noted: 2023-02-23

## 2023-02-23 PROBLEM — E66.812 CLASS 2 OBESITY IN ADULT: Chronic | Status: ACTIVE | Noted: 2023-02-23

## 2023-02-23 PROBLEM — E11.9 DM (DIABETES MELLITUS), TYPE 2: Chronic | Status: ACTIVE | Noted: 2023-02-14

## 2023-02-23 PROBLEM — K21.9 GERD (GASTROESOPHAGEAL REFLUX DISEASE): Status: ACTIVE | Noted: 2023-02-23

## 2023-02-23 PROBLEM — I10 ESSENTIAL HYPERTENSION: Chronic | Status: ACTIVE | Noted: 2023-02-14

## 2023-02-23 PROBLEM — E66.9 CLASS 2 OBESITY IN ADULT: Chronic | Status: ACTIVE | Noted: 2023-02-23

## 2023-02-23 PROBLEM — Z72.0 TOBACCO USE: Chronic | Status: ACTIVE | Noted: 2023-02-23

## 2023-02-23 PROBLEM — E66.812 CLASS 2 OBESITY IN ADULT: Status: ACTIVE | Noted: 2023-02-23

## 2023-02-23 LAB
ABO GROUP BLD: NORMAL
ACT BLD: 137 SECONDS (ref 82–152)
ANION GAP SERPL CALCULATED.3IONS-SCNC: 10 MMOL/L (ref 5–15)
APTT PPP: 34.3 SECONDS (ref 22–39)
BASE EXCESS BLDA CALC-SCNC: 3 MMOL/L (ref -5–5)
BUN SERPL-MCNC: 18 MG/DL (ref 6–20)
BUN/CREAT SERPL: 20.7 (ref 7–25)
CA-I BLDA-SCNC: 1.24 MMOL/L (ref 1.2–1.32)
CALCIUM SPEC-SCNC: 8.6 MG/DL (ref 8.6–10.5)
CHLORIDE SERPL-SCNC: 105 MMOL/L (ref 98–107)
CO2 BLDA-SCNC: 29 MMOL/L (ref 24–29)
CO2 SERPL-SCNC: 25 MMOL/L (ref 22–29)
CREAT SERPL-MCNC: 0.87 MG/DL (ref 0.76–1.27)
DEPRECATED RDW RBC AUTO: 45.8 FL (ref 37–54)
EGFRCR SERPLBLD CKD-EPI 2021: 100 ML/MIN/1.73
ERYTHROCYTE [DISTWIDTH] IN BLOOD BY AUTOMATED COUNT: 14.1 % (ref 12.3–15.4)
GLUCOSE BLDC GLUCOMTR-MCNC: 125 MG/DL (ref 70–130)
GLUCOSE BLDC GLUCOMTR-MCNC: 126 MG/DL (ref 70–130)
GLUCOSE BLDC GLUCOMTR-MCNC: 136 MG/DL (ref 70–130)
GLUCOSE BLDC GLUCOMTR-MCNC: 158 MG/DL (ref 70–130)
GLUCOSE BLDC GLUCOMTR-MCNC: 170 MG/DL (ref 70–130)
GLUCOSE BLDC GLUCOMTR-MCNC: 219 MG/DL (ref 70–130)
GLUCOSE SERPL-MCNC: 171 MG/DL (ref 65–99)
HCO3 BLDA-SCNC: 27.7 MMOL/L (ref 22–26)
HCT VFR BLD AUTO: 38.4 % (ref 37.5–51)
HCT VFR BLD AUTO: 39.7 % (ref 37.5–51)
HCT VFR BLDA CALC: 39 % (ref 38–51)
HGB BLD-MCNC: 12.9 G/DL (ref 13–17.7)
HGB BLD-MCNC: 13.4 G/DL (ref 13–17.7)
HGB BLDA-MCNC: 13.3 G/DL (ref 12–17)
MAGNESIUM SERPL-MCNC: 1.9 MG/DL (ref 1.6–2.6)
MCH RBC QN AUTO: 30.3 PG (ref 26.6–33)
MCHC RBC AUTO-ENTMCNC: 33.8 G/DL (ref 31.5–35.7)
MCV RBC AUTO: 89.8 FL (ref 79–97)
PCO2 BLDA: 44.8 MM HG (ref 35–45)
PH BLDA: 7.4 PH UNITS (ref 7.35–7.6)
PLATELET # BLD AUTO: 204 10*3/MM3 (ref 140–450)
PMV BLD AUTO: 8.9 FL (ref 6–12)
PO2 BLDA: 211 MMHG (ref 80–105)
POTASSIUM BLDA-SCNC: 3.5 MMOL/L (ref 3.5–4.9)
POTASSIUM SERPL-SCNC: 3.7 MMOL/L (ref 3.5–5.2)
QT INTERVAL: 428 MS
QTC INTERVAL: 502 MS
RBC # BLD AUTO: 4.42 10*6/MM3 (ref 4.14–5.8)
RH BLD: POSITIVE
SAO2 % BLDA: 100 % (ref 95–98)
SODIUM BLD-SCNC: 141 MMOL/L (ref 138–146)
SODIUM SERPL-SCNC: 140 MMOL/L (ref 136–145)
WBC NRBC COR # BLD: 12.25 10*3/MM3 (ref 3.4–10.8)

## 2023-02-23 PROCEDURE — 82330 ASSAY OF CALCIUM: CPT

## 2023-02-23 PROCEDURE — 25010000002 DEXAMETHASONE PER 1 MG: Performed by: NURSE ANESTHETIST, CERTIFIED REGISTERED

## 2023-02-23 PROCEDURE — C1894 INTRO/SHEATH, NON-LASER: HCPCS | Performed by: THORACIC SURGERY (CARDIOTHORACIC VASCULAR SURGERY)

## 2023-02-23 PROCEDURE — 84295 ASSAY OF SERUM SODIUM: CPT

## 2023-02-23 PROCEDURE — 85018 HEMOGLOBIN: CPT | Performed by: INTERNAL MEDICINE

## 2023-02-23 PROCEDURE — C1769 GUIDE WIRE: HCPCS | Performed by: THORACIC SURGERY (CARDIOTHORACIC VASCULAR SURGERY)

## 2023-02-23 PROCEDURE — 25010000002 HEPARIN (PORCINE) PER 1000 UNITS: Performed by: THORACIC SURGERY (CARDIOTHORACIC VASCULAR SURGERY)

## 2023-02-23 PROCEDURE — 63710000001 INSULIN REGULAR HUMAN PER 5 UNITS: Performed by: INTERNAL MEDICINE

## 2023-02-23 PROCEDURE — 0 IOPAMIDOL PER 1 ML: Performed by: THORACIC SURGERY (CARDIOTHORACIC VASCULAR SURGERY)

## 2023-02-23 PROCEDURE — 93355 ECHO TRANSESOPHAGEAL (TEE): CPT

## 2023-02-23 PROCEDURE — 25010000002 ONDANSETRON PER 1 MG: Performed by: NURSE ANESTHETIST, CERTIFIED REGISTERED

## 2023-02-23 PROCEDURE — 25010000002 FUROSEMIDE PER 20 MG: Performed by: INTERNAL MEDICINE

## 2023-02-23 PROCEDURE — 25010000002 PHENYLEPHRINE 10 MG/ML SOLUTION: Performed by: NURSE ANESTHETIST, CERTIFIED REGISTERED

## 2023-02-23 PROCEDURE — 80048 BASIC METABOLIC PNL TOTAL CA: CPT | Performed by: PHYSICIAN ASSISTANT

## 2023-02-23 PROCEDURE — 93005 ELECTROCARDIOGRAM TRACING: CPT | Performed by: PHYSICIAN ASSISTANT

## 2023-02-23 PROCEDURE — 99233 SBSQ HOSP IP/OBS HIGH 50: CPT | Performed by: INTERNAL MEDICINE

## 2023-02-23 PROCEDURE — 85730 THROMBOPLASTIN TIME PARTIAL: CPT | Performed by: THORACIC SURGERY (CARDIOTHORACIC VASCULAR SURGERY)

## 2023-02-23 PROCEDURE — 25010000002 PHENYLEPHRINE 10 MG/ML SOLUTION 1 ML VIAL: Performed by: NURSE ANESTHETIST, CERTIFIED REGISTERED

## 2023-02-23 PROCEDURE — 86900 BLOOD TYPING SEROLOGIC ABO: CPT

## 2023-02-23 PROCEDURE — 33361 REPLACE AORTIC VALVE PERQ: CPT | Performed by: THORACIC SURGERY (CARDIOTHORACIC VASCULAR SURGERY)

## 2023-02-23 PROCEDURE — C1889 IMPLANT/INSERT DEVICE, NOC: HCPCS | Performed by: THORACIC SURGERY (CARDIOTHORACIC VASCULAR SURGERY)

## 2023-02-23 PROCEDURE — 94799 UNLISTED PULMONARY SVC/PX: CPT

## 2023-02-23 PROCEDURE — 02RF38Z REPLACEMENT OF AORTIC VALVE WITH ZOOPLASTIC TISSUE, PERCUTANEOUS APPROACH: ICD-10-PCS | Performed by: THORACIC SURGERY (CARDIOTHORACIC VASCULAR SURGERY)

## 2023-02-23 PROCEDURE — 84132 ASSAY OF SERUM POTASSIUM: CPT

## 2023-02-23 PROCEDURE — 94640 AIRWAY INHALATION TREATMENT: CPT

## 2023-02-23 PROCEDURE — C1760 CLOSURE DEV, VASC: HCPCS | Performed by: THORACIC SURGERY (CARDIOTHORACIC VASCULAR SURGERY)

## 2023-02-23 PROCEDURE — 85027 COMPLETE CBC AUTOMATED: CPT | Performed by: PHYSICIAN ASSISTANT

## 2023-02-23 PROCEDURE — 82803 BLOOD GASES ANY COMBINATION: CPT

## 2023-02-23 PROCEDURE — 25010000002 HEPARIN (PORCINE) PER 1000 UNITS: Performed by: NURSE ANESTHETIST, CERTIFIED REGISTERED

## 2023-02-23 PROCEDURE — 25010000002 VANCOMYCIN 10 G RECONSTITUTED SOLUTION: Performed by: NURSE PRACTITIONER

## 2023-02-23 PROCEDURE — 85347 COAGULATION TIME ACTIVATED: CPT

## 2023-02-23 PROCEDURE — 93010 ELECTROCARDIOGRAM REPORT: CPT | Performed by: INTERNAL MEDICINE

## 2023-02-23 PROCEDURE — B41F1ZZ FLUOROSCOPY OF RIGHT LOWER EXTREMITY ARTERIES USING LOW OSMOLAR CONTRAST: ICD-10-PCS | Performed by: INTERNAL MEDICINE

## 2023-02-23 PROCEDURE — C1887 CATHETER, GUIDING: HCPCS | Performed by: THORACIC SURGERY (CARDIOTHORACIC VASCULAR SURGERY)

## 2023-02-23 PROCEDURE — 25010000002 VANCOMYCIN 1 G RECONSTITUTED SOLUTION 1 EACH VIAL: Performed by: NURSE ANESTHETIST, CERTIFIED REGISTERED

## 2023-02-23 PROCEDURE — 85014 HEMATOCRIT: CPT

## 2023-02-23 PROCEDURE — 86901 BLOOD TYPING SEROLOGIC RH(D): CPT

## 2023-02-23 PROCEDURE — 0 MAGNESIUM SULFATE 4 GM/100ML SOLUTION: Performed by: THORACIC SURGERY (CARDIOTHORACIC VASCULAR SURGERY)

## 2023-02-23 PROCEDURE — 93355 ECHO TRANSESOPHAGEAL (TEE): CPT | Performed by: INTERNAL MEDICINE

## 2023-02-23 PROCEDURE — 85014 HEMATOCRIT: CPT | Performed by: INTERNAL MEDICINE

## 2023-02-23 PROCEDURE — 83735 ASSAY OF MAGNESIUM: CPT | Performed by: THORACIC SURGERY (CARDIOTHORACIC VASCULAR SURGERY)

## 2023-02-23 PROCEDURE — 82947 ASSAY GLUCOSE BLOOD QUANT: CPT

## 2023-02-23 PROCEDURE — 25010000002 FENTANYL CITRATE (PF) 100 MCG/2ML SOLUTION: Performed by: NURSE ANESTHETIST, CERTIFIED REGISTERED

## 2023-02-23 PROCEDURE — 25010000002 PROTAMINE SULFATE PER 10 MG: Performed by: NURSE ANESTHETIST, CERTIFIED REGISTERED

## 2023-02-23 DEVICE — VLV HEART TRNSCATH SAPIEN3 29MM: Type: IMPLANTABLE DEVICE | Site: AORTA | Status: FUNCTIONAL

## 2023-02-23 RX ORDER — SODIUM CHLORIDE 9 MG/ML
100 INJECTION, SOLUTION INTRAVENOUS CONTINUOUS
Status: ACTIVE | OUTPATIENT
Start: 2023-02-23 | End: 2023-02-23

## 2023-02-23 RX ORDER — DEXTROSE MONOHYDRATE 25 G/50ML
25 INJECTION, SOLUTION INTRAVENOUS
Status: DISCONTINUED | OUTPATIENT
Start: 2023-02-23 | End: 2023-02-24 | Stop reason: HOSPADM

## 2023-02-23 RX ORDER — AMLODIPINE BESYLATE 5 MG/1
5 TABLET ORAL DAILY
Status: DISCONTINUED | OUTPATIENT
Start: 2023-02-23 | End: 2023-02-23

## 2023-02-23 RX ORDER — PHENYLEPHRINE HYDROCHLORIDE 10 MG/ML
INJECTION INTRAVENOUS AS NEEDED
Status: DISCONTINUED | OUTPATIENT
Start: 2023-02-23 | End: 2023-02-23 | Stop reason: SURG

## 2023-02-23 RX ORDER — SODIUM CHLORIDE 9 MG/ML
INJECTION, SOLUTION INTRAVENOUS AS NEEDED
Status: DISCONTINUED | OUTPATIENT
Start: 2023-02-23 | End: 2023-02-23 | Stop reason: HOSPADM

## 2023-02-23 RX ORDER — MORPHINE SULFATE 2 MG/ML
2 INJECTION, SOLUTION INTRAMUSCULAR; INTRAVENOUS
Status: DISCONTINUED | OUTPATIENT
Start: 2023-02-23 | End: 2023-02-24 | Stop reason: HOSPADM

## 2023-02-23 RX ORDER — FUROSEMIDE 40 MG/1
40 TABLET ORAL DAILY
Status: DISCONTINUED | OUTPATIENT
Start: 2023-02-24 | End: 2023-02-24 | Stop reason: HOSPADM

## 2023-02-23 RX ORDER — FENTANYL CITRATE 50 UG/ML
INJECTION, SOLUTION INTRAMUSCULAR; INTRAVENOUS AS NEEDED
Status: DISCONTINUED | OUTPATIENT
Start: 2023-02-23 | End: 2023-02-23 | Stop reason: SURG

## 2023-02-23 RX ORDER — VALSARTAN 80 MG/1
80 TABLET ORAL DAILY
Status: DISCONTINUED | OUTPATIENT
Start: 2023-02-23 | End: 2023-02-24 | Stop reason: HOSPADM

## 2023-02-23 RX ORDER — FAMOTIDINE 20 MG/1
20 TABLET, FILM COATED ORAL ONCE
Status: DISCONTINUED | OUTPATIENT
Start: 2023-02-23 | End: 2023-02-23 | Stop reason: HOSPADM

## 2023-02-23 RX ORDER — NICARDIPINE HYDROCHLORIDE 2.5 MG/ML
INJECTION INTRAVENOUS
Status: ACTIVE
Start: 2023-02-23 | End: 2023-02-23

## 2023-02-23 RX ORDER — ATORVASTATIN CALCIUM 40 MG/1
40 TABLET, FILM COATED ORAL NIGHTLY
Status: DISCONTINUED | OUTPATIENT
Start: 2023-02-23 | End: 2023-02-24

## 2023-02-23 RX ORDER — SODIUM CHLORIDE 9 MG/ML
40 INJECTION, SOLUTION INTRAVENOUS AS NEEDED
Status: DISCONTINUED | OUTPATIENT
Start: 2023-02-23 | End: 2023-02-23 | Stop reason: HOSPADM

## 2023-02-23 RX ORDER — BISOPROLOL FUMARATE 5 MG/1
2.5 TABLET, FILM COATED ORAL NIGHTLY
Status: DISCONTINUED | OUTPATIENT
Start: 2023-02-23 | End: 2023-02-24 | Stop reason: HOSPADM

## 2023-02-23 RX ORDER — ONDANSETRON 2 MG/ML
4 INJECTION INTRAMUSCULAR; INTRAVENOUS EVERY 6 HOURS PRN
Status: DISCONTINUED | OUTPATIENT
Start: 2023-02-23 | End: 2023-02-24 | Stop reason: HOSPADM

## 2023-02-23 RX ORDER — MIDAZOLAM HYDROCHLORIDE 1 MG/ML
1 INJECTION INTRAMUSCULAR; INTRAVENOUS
Status: DISCONTINUED | OUTPATIENT
Start: 2023-02-23 | End: 2023-02-23 | Stop reason: HOSPADM

## 2023-02-23 RX ORDER — ROCURONIUM BROMIDE 10 MG/ML
INJECTION, SOLUTION INTRAVENOUS AS NEEDED
Status: DISCONTINUED | OUTPATIENT
Start: 2023-02-23 | End: 2023-02-23 | Stop reason: SURG

## 2023-02-23 RX ORDER — PROTAMINE SULFATE 10 MG/ML
INJECTION, SOLUTION INTRAVENOUS AS NEEDED
Status: DISCONTINUED | OUTPATIENT
Start: 2023-02-23 | End: 2023-02-23 | Stop reason: SURG

## 2023-02-23 RX ORDER — DEXAMETHASONE SODIUM PHOSPHATE 4 MG/ML
INJECTION, SOLUTION INTRA-ARTICULAR; INTRALESIONAL; INTRAMUSCULAR; INTRAVENOUS; SOFT TISSUE AS NEEDED
Status: DISCONTINUED | OUTPATIENT
Start: 2023-02-23 | End: 2023-02-23 | Stop reason: SURG

## 2023-02-23 RX ORDER — ESMOLOL HYDROCHLORIDE 10 MG/ML
INJECTION INTRAVENOUS AS NEEDED
Status: DISCONTINUED | OUTPATIENT
Start: 2023-02-23 | End: 2023-02-23 | Stop reason: SURG

## 2023-02-23 RX ORDER — CETIRIZINE HYDROCHLORIDE 10 MG/1
10 TABLET ORAL DAILY
Status: DISCONTINUED | OUTPATIENT
Start: 2023-02-24 | End: 2023-02-23

## 2023-02-23 RX ORDER — ONDANSETRON 4 MG/1
4 TABLET, FILM COATED ORAL EVERY 6 HOURS PRN
Status: DISCONTINUED | OUTPATIENT
Start: 2023-02-23 | End: 2023-02-24 | Stop reason: HOSPADM

## 2023-02-23 RX ORDER — CHLORHEXIDINE GLUCONATE 500 MG/1
1 CLOTH TOPICAL EVERY 12 HOURS PRN
Status: DISCONTINUED | OUTPATIENT
Start: 2023-02-23 | End: 2023-02-23 | Stop reason: HOSPADM

## 2023-02-23 RX ORDER — IPRATROPIUM BROMIDE AND ALBUTEROL SULFATE 2.5; .5 MG/3ML; MG/3ML
3 SOLUTION RESPIRATORY (INHALATION)
Status: DISCONTINUED | OUTPATIENT
Start: 2023-02-23 | End: 2023-02-24 | Stop reason: HOSPADM

## 2023-02-23 RX ORDER — POTASSIUM CHLORIDE 750 MG/1
10 CAPSULE, EXTENDED RELEASE ORAL DAILY
Status: DISCONTINUED | OUTPATIENT
Start: 2023-02-24 | End: 2023-02-24 | Stop reason: HOSPADM

## 2023-02-23 RX ORDER — NICARDIPINE HYDROCHLORIDE 2.5 MG/ML
INJECTION INTRAVENOUS CONTINUOUS PRN
Status: DISCONTINUED | OUTPATIENT
Start: 2023-02-23 | End: 2023-02-23 | Stop reason: SURG

## 2023-02-23 RX ORDER — ASPIRIN 81 MG/1
81 TABLET ORAL DAILY
Status: DISCONTINUED | OUTPATIENT
Start: 2023-02-23 | End: 2023-02-23

## 2023-02-23 RX ORDER — LABETALOL HYDROCHLORIDE 5 MG/ML
INJECTION, SOLUTION INTRAVENOUS AS NEEDED
Status: DISCONTINUED | OUTPATIENT
Start: 2023-02-23 | End: 2023-02-23 | Stop reason: SURG

## 2023-02-23 RX ORDER — ALBUTEROL SULFATE 90 UG/1
2 AEROSOL, METERED RESPIRATORY (INHALATION) EVERY 4 HOURS PRN
COMMUNITY
End: 2023-03-15

## 2023-02-23 RX ORDER — SODIUM CHLORIDE, SODIUM LACTATE, POTASSIUM CHLORIDE, CALCIUM CHLORIDE 600; 310; 30; 20 MG/100ML; MG/100ML; MG/100ML; MG/100ML
9 INJECTION, SOLUTION INTRAVENOUS CONTINUOUS
Status: DISCONTINUED | OUTPATIENT
Start: 2023-02-23 | End: 2023-02-23

## 2023-02-23 RX ORDER — ASPIRIN 81 MG/1
81 TABLET ORAL DAILY
Status: DISCONTINUED | OUTPATIENT
Start: 2023-02-24 | End: 2023-02-24

## 2023-02-23 RX ORDER — MAGNESIUM SULFATE HEPTAHYDRATE 40 MG/ML
4 INJECTION, SOLUTION INTRAVENOUS ONCE
Status: COMPLETED | OUTPATIENT
Start: 2023-02-23 | End: 2023-02-23

## 2023-02-23 RX ORDER — HYDROCODONE BITARTRATE AND ACETAMINOPHEN 7.5; 325 MG/1; MG/1
1 TABLET ORAL EVERY 4 HOURS PRN
Status: DISCONTINUED | OUTPATIENT
Start: 2023-02-23 | End: 2023-02-24 | Stop reason: HOSPADM

## 2023-02-23 RX ORDER — ONDANSETRON 2 MG/ML
INJECTION INTRAMUSCULAR; INTRAVENOUS AS NEEDED
Status: DISCONTINUED | OUTPATIENT
Start: 2023-02-23 | End: 2023-02-23 | Stop reason: SURG

## 2023-02-23 RX ORDER — SODIUM CHLORIDE 0.9 % (FLUSH) 0.9 %
10 SYRINGE (ML) INJECTION EVERY 12 HOURS SCHEDULED
Status: DISCONTINUED | OUTPATIENT
Start: 2023-02-23 | End: 2023-02-23 | Stop reason: HOSPADM

## 2023-02-23 RX ORDER — ETOMIDATE 2 MG/ML
INJECTION INTRAVENOUS AS NEEDED
Status: DISCONTINUED | OUTPATIENT
Start: 2023-02-23 | End: 2023-02-23 | Stop reason: SURG

## 2023-02-23 RX ORDER — ACETAMINOPHEN 325 MG/1
650 TABLET ORAL EVERY 4 HOURS PRN
Status: DISCONTINUED | OUTPATIENT
Start: 2023-02-23 | End: 2023-02-24 | Stop reason: HOSPADM

## 2023-02-23 RX ORDER — ALBUTEROL SULFATE 90 UG/1
AEROSOL, METERED RESPIRATORY (INHALATION) AS NEEDED
Status: DISCONTINUED | OUTPATIENT
Start: 2023-02-23 | End: 2023-02-23 | Stop reason: SURG

## 2023-02-23 RX ORDER — CETIRIZINE HYDROCHLORIDE 10 MG/1
10 TABLET ORAL DAILY
Status: DISCONTINUED | OUTPATIENT
Start: 2023-02-23 | End: 2023-02-24 | Stop reason: HOSPADM

## 2023-02-23 RX ORDER — VANCOMYCIN HYDROCHLORIDE 1 G/20ML
INJECTION, POWDER, LYOPHILIZED, FOR SOLUTION INTRAVENOUS AS NEEDED
Status: DISCONTINUED | OUTPATIENT
Start: 2023-02-23 | End: 2023-02-23 | Stop reason: SURG

## 2023-02-23 RX ORDER — HEPARIN SODIUM 1000 [USP'U]/ML
INJECTION, SOLUTION INTRAVENOUS; SUBCUTANEOUS AS NEEDED
Status: DISCONTINUED | OUTPATIENT
Start: 2023-02-23 | End: 2023-02-23 | Stop reason: SURG

## 2023-02-23 RX ORDER — HYDRALAZINE HYDROCHLORIDE 20 MG/ML
10 INJECTION INTRAMUSCULAR; INTRAVENOUS EVERY 6 HOURS PRN
Status: DISCONTINUED | OUTPATIENT
Start: 2023-02-23 | End: 2023-02-24 | Stop reason: HOSPADM

## 2023-02-23 RX ORDER — NICOTINE POLACRILEX 4 MG
15 LOZENGE BUCCAL
Status: DISCONTINUED | OUTPATIENT
Start: 2023-02-23 | End: 2023-02-24 | Stop reason: HOSPADM

## 2023-02-23 RX ORDER — SODIUM CHLORIDE 9 MG/ML
250 INJECTION, SOLUTION INTRAVENOUS ONCE AS NEEDED
Status: DISCONTINUED | OUTPATIENT
Start: 2023-02-23 | End: 2023-02-24 | Stop reason: HOSPADM

## 2023-02-23 RX ORDER — NITROGLYCERIN 0.4 MG/1
0.4 TABLET SUBLINGUAL
Status: DISCONTINUED | OUTPATIENT
Start: 2023-02-23 | End: 2023-02-23 | Stop reason: HOSPADM

## 2023-02-23 RX ORDER — IBUPROFEN 600 MG/1
1 TABLET ORAL
Status: DISCONTINUED | OUTPATIENT
Start: 2023-02-23 | End: 2023-02-24 | Stop reason: HOSPADM

## 2023-02-23 RX ORDER — SODIUM CHLORIDE 9 MG/ML
INJECTION, SOLUTION INTRAVENOUS CONTINUOUS PRN
Status: DISCONTINUED | OUTPATIENT
Start: 2023-02-23 | End: 2023-02-23 | Stop reason: SURG

## 2023-02-23 RX ORDER — LABETALOL HYDROCHLORIDE 5 MG/ML
10 INJECTION, SOLUTION INTRAVENOUS
Status: DISCONTINUED | OUTPATIENT
Start: 2023-02-23 | End: 2023-02-24 | Stop reason: HOSPADM

## 2023-02-23 RX ORDER — SODIUM CHLORIDE 0.9 % (FLUSH) 0.9 %
10 SYRINGE (ML) INJECTION AS NEEDED
Status: DISCONTINUED | OUTPATIENT
Start: 2023-02-23 | End: 2023-02-23 | Stop reason: HOSPADM

## 2023-02-23 RX ORDER — AMLODIPINE BESYLATE 5 MG/1
5 TABLET ORAL DAILY
Status: DISCONTINUED | OUTPATIENT
Start: 2023-02-24 | End: 2023-02-24 | Stop reason: HOSPADM

## 2023-02-23 RX ORDER — CHLORHEXIDINE GLUCONATE 0.12 MG/ML
15 RINSE ORAL ONCE
Status: COMPLETED | OUTPATIENT
Start: 2023-02-23 | End: 2023-02-23

## 2023-02-23 RX ORDER — FUROSEMIDE 10 MG/ML
40 INJECTION INTRAMUSCULAR; INTRAVENOUS ONCE
Status: COMPLETED | OUTPATIENT
Start: 2023-02-23 | End: 2023-02-23

## 2023-02-23 RX ORDER — PANTOPRAZOLE SODIUM 40 MG/1
40 TABLET, DELAYED RELEASE ORAL
Status: DISCONTINUED | OUTPATIENT
Start: 2023-02-24 | End: 2023-02-24 | Stop reason: HOSPADM

## 2023-02-23 RX ADMIN — ESMOLOL HYDROCHLORIDE 100 MG: 100 INJECTION, SOLUTION INTRAVENOUS at 07:06

## 2023-02-23 RX ADMIN — VANCOMYCIN HYDROCHLORIDE 2000 MG: 10 INJECTION, POWDER, LYOPHILIZED, FOR SOLUTION INTRAVENOUS at 06:23

## 2023-02-23 RX ADMIN — CHLORHEXIDINE GLUCONATE 0.12% ORAL RINSE 15 ML: 1.2 LIQUID ORAL at 06:20

## 2023-02-23 RX ADMIN — SUGAMMADEX 300 MG: 100 INJECTION, SOLUTION INTRAVENOUS at 07:51

## 2023-02-23 RX ADMIN — NICARDIPINE HYDROCHLORIDE 7.5 MG/HR: 2.5 INJECTION, SOLUTION INTRAVENOUS at 23:37

## 2023-02-23 RX ADMIN — HYDROCODONE BITARTRATE AND ACETAMINOPHEN 1 TABLET: 7.5; 325 TABLET ORAL at 22:01

## 2023-02-23 RX ADMIN — ROCURONIUM BROMIDE 50 MG: 10 INJECTION INTRAVENOUS at 07:06

## 2023-02-23 RX ADMIN — ROCURONIUM BROMIDE 10 MG: 10 INJECTION INTRAVENOUS at 07:33

## 2023-02-23 RX ADMIN — IPRATROPIUM BROMIDE AND ALBUTEROL SULFATE 3 ML: 2.5; .5 SOLUTION RESPIRATORY (INHALATION) at 20:08

## 2023-02-23 RX ADMIN — IPRATROPIUM BROMIDE AND ALBUTEROL SULFATE 3 ML: 2.5; .5 SOLUTION RESPIRATORY (INHALATION) at 12:18

## 2023-02-23 RX ADMIN — INSULIN HUMAN 2 UNITS: 100 INJECTION, SOLUTION PARENTERAL at 17:51

## 2023-02-23 RX ADMIN — ETOMIDATE 20 MG: 2 INJECTION, SOLUTION INTRAVENOUS at 07:06

## 2023-02-23 RX ADMIN — FENTANYL CITRATE 50 MCG: 50 INJECTION, SOLUTION INTRAMUSCULAR; INTRAVENOUS at 08:19

## 2023-02-23 RX ADMIN — PROTAMINE SULFATE 180 MG: 10 INJECTION, SOLUTION INTRAVENOUS at 07:50

## 2023-02-23 RX ADMIN — NICARDIPINE HYDROCHLORIDE 5 MG/HR: 2.5 INJECTION, SOLUTION INTRAVENOUS at 11:45

## 2023-02-23 RX ADMIN — FENTANYL CITRATE 50 MCG: 50 INJECTION, SOLUTION INTRAMUSCULAR; INTRAVENOUS at 07:10

## 2023-02-23 RX ADMIN — PHENYLEPHRINE HYDROCHLORIDE 0.5 MCG/KG/MIN: 10 INJECTION INTRAVENOUS at 07:19

## 2023-02-23 RX ADMIN — SODIUM CHLORIDE: 9 INJECTION, SOLUTION INTRAVENOUS at 07:21

## 2023-02-23 RX ADMIN — LABETALOL HYDROCHLORIDE 5 MG: 5 INJECTION, SOLUTION INTRAVENOUS at 08:21

## 2023-02-23 RX ADMIN — VANCOMYCIN HYDROCHLORIDE 2000 G: 1 INJECTION, POWDER, LYOPHILIZED, FOR SOLUTION INTRAVENOUS at 06:55

## 2023-02-23 RX ADMIN — SODIUM CHLORIDE 100 ML/HR: 9 INJECTION, SOLUTION INTRAVENOUS at 09:37

## 2023-02-23 RX ADMIN — IPRATROPIUM BROMIDE AND ALBUTEROL SULFATE 3 ML: 2.5; .5 SOLUTION RESPIRATORY (INHALATION) at 16:41

## 2023-02-23 RX ADMIN — DEXAMETHASONE SODIUM PHOSPHATE 4 MG: 4 INJECTION, SOLUTION INTRAMUSCULAR; INTRAVENOUS at 07:22

## 2023-02-23 RX ADMIN — HEPARIN SODIUM 18000 UNITS: 1000 INJECTION, SOLUTION INTRAVENOUS; SUBCUTANEOUS at 07:31

## 2023-02-23 RX ADMIN — MUPIROCIN 1 APPLICATION: 20 OINTMENT TOPICAL at 06:20

## 2023-02-23 RX ADMIN — BISOPROLOL FUMARATE 2.5 MG: 5 TABLET, FILM COATED ORAL at 22:00

## 2023-02-23 RX ADMIN — VALSARTAN 80 MG: 80 TABLET, FILM COATED ORAL at 10:43

## 2023-02-23 RX ADMIN — CETIRIZINE HYDROCHLORIDE 10 MG: 10 TABLET, FILM COATED ORAL at 17:51

## 2023-02-23 RX ADMIN — SODIUM CHLORIDE, POTASSIUM CHLORIDE, SODIUM LACTATE AND CALCIUM CHLORIDE 9 ML/HR: 600; 310; 30; 20 INJECTION, SOLUTION INTRAVENOUS at 06:16

## 2023-02-23 RX ADMIN — NICARDIPINE HYDROCHLORIDE 5 MG/HR: 25 INJECTION INTRAVENOUS at 07:46

## 2023-02-23 RX ADMIN — ONDANSETRON 4 MG: 2 INJECTION INTRAMUSCULAR; INTRAVENOUS at 07:22

## 2023-02-23 RX ADMIN — ALBUTEROL SULFATE 4 PUFF: 90 AEROSOL, METERED RESPIRATORY (INHALATION) at 07:55

## 2023-02-23 RX ADMIN — PHENYLEPHRINE HYDROCHLORIDE 100 MCG: 10 INJECTION INTRAVENOUS at 07:26

## 2023-02-23 RX ADMIN — ATORVASTATIN CALCIUM 40 MG: 40 TABLET, FILM COATED ORAL at 22:01

## 2023-02-23 RX ADMIN — NICARDIPINE HYDROCHLORIDE 7.5 MG/HR: 2.5 INJECTION, SOLUTION INTRAVENOUS at 15:00

## 2023-02-23 RX ADMIN — MAGNESIUM SULFATE HEPTAHYDRATE 4 G: 40 INJECTION, SOLUTION INTRAVENOUS at 10:43

## 2023-02-23 RX ADMIN — FUROSEMIDE 40 MG: 10 INJECTION, SOLUTION INTRAMUSCULAR; INTRAVENOUS at 13:29

## 2023-02-23 RX ADMIN — PHENYLEPHRINE HYDROCHLORIDE 100 MCG: 10 INJECTION INTRAVENOUS at 07:23

## 2023-02-23 NOTE — ANESTHESIA POSTPROCEDURE EVALUATION
Patient: John Wolfe    Procedure Summary     Date: 02/23/23 Room / Location: [facility] OR 01 /  ROMA HYBRID LOLI    Anesthesia Start: 0700 Anesthesia Stop:     Procedures:       TRANSCATHETER AORTIC VALVE REPLACEMENT,TEODORO (Chest)      Transfemoral Transcatheter Aortic Valve Replacement Diagnosis:       Nodular calcific aortic valve stenosis      (Nodular calcific aortic valve stenosis [I35.0])    Surgeons: Clemente Barger MD; Edwin Guan MD Provider: Megan Cruz MD    Anesthesia Type: general, Neva ASA Status: 4          Anesthesia Type: general, [place]    Vitals  Vitals Value Taken Time   BP     Temp     Pulse 85 02/23/23 0855   Resp     SpO2 100 % 02/23/23 0855   Vitals shown include unvalidated device data.        Post Anesthesia Care and Evaluation    Patient location during evaluation: ICU  Patient participation: complete - patient participated  Level of consciousness: awake and alert  Pain score: 0  Pain management: adequate    Airway patency: patent  Anesthetic complications: No anesthetic complications  PONV Status: none  Cardiovascular status: hemodynamically stable and acceptable  Respiratory status: nonlabored ventilation, acceptable, nasal cannula and spontaneous ventilation  Hydration status: acceptable    Comments: VS monitored en route to ICU. VSS upon arrival  No anesthesia care post op

## 2023-02-23 NOTE — ANESTHESIA PROCEDURE NOTES
Arterial Line      Patient reassessed immediately prior to procedure    Patient location during procedure: pre-op   Line placed for hemodynamic monitoring.  Performed By   SHELLY/CAA: Adam Hernandez CRNA   Preanesthetic Checklist  Completed: patient identified, IV checked, site marked, risks and benefits discussed, surgical consent, monitors and equipment checked, pre-op evaluation and timeout performed  Arterial Line Prep    Sterile Tech: cap, gloves and sterile barriers  Prep: ChloraPrep  Patient monitoring: blood pressure monitoring, continuous pulse oximetry and EKG  Arterial Line Procedure   Laterality:right  Location:  radial artery  Catheter size: 20 G   Guidance: ultrasound guided and palpation technique  Number of attempts: 2  Successful placement: yes Images: still images not obtained  Post Assessment   Dressing Type: line sutured, occlusive dressing applied, secured with tape and wrist guard applied.   Complications no  Circ/Move/Sens Assessment: normal and unchanged.   Patient Tolerance: patient tolerated the procedure well with no apparent complications

## 2023-02-23 NOTE — ANESTHESIA PROCEDURE NOTES
Airway  Urgency: elective    Date/Time: 2/23/2023 7:09 AM  Airway not difficult    General Information and Staff    Patient location during procedure: OR  CRNA/CAA: Adam Hernandez CRNA    Indications and Patient Condition  Indications for airway management: airway protection    Preoxygenated: yes  MILS not maintained throughout  Mask difficulty assessment: 3 - difficult mask (inadequate, unstable or two providers) +/- NMBA    Final Airway Details  Final airway type: endotracheal airway      Successful airway: ETT  Cuffed: yes   Successful intubation technique: direct laryngoscopy  Endotracheal tube insertion site: oral  Blade: Lai  Blade size: 4  ETT size (mm): 8.0  Cormack-Lehane Classification: grade IIa - partial view of glottis  Placement verified by: chest auscultation and capnometry   Measured from: lips  ETT/EBT  to lips (cm): 22  Number of attempts at approach: 1  Assessment: lips, teeth, and gum same as pre-op and atraumatic intubation    Additional Comments  Negative epigastric sounds, Breath sound equal bilaterally with symmetric chest rise and fall

## 2023-02-23 NOTE — PROGRESS NOTES
Structural Heart Team Meeting Summary         Patient Name: John Wolfe  YOB: 1965     Referring Physician: Dr. Guan  Admission Status: Inpatient     Attendees: Clemente Barger MD, Jorge Hummel MD, Ac Paul MD, Kay Kc MD, Isi Mccracken MD, Edwin Guan MD, [facility]ouch Clinical Specialist, Mary Schultz, Megan Cruz, Brandon Davila MD and KYLAH Lamar  Primary presentation of AS: Angina and  SOA   Heart Failure:   HFpEF   NYHA Functional Class: II   LVEF:  60%   ANNULUS Measurement: 2.5cm    Major Organ Compromise: N/A      Procedure Specific Impediment: N/A      Other Factors:   Major Nutritional Deficit: No  Cognitive Impairment: No  Oxygen dependent: No    STS Risk Score:   Mortality Risk: 1.4%  Mortality and Morbidity Risk: **10.4%    TAVR/TMVR Rationale: 57 year old male with severe symptomatic AS and low STS risk score. Structural heart team in agreement to proceed with TAVR         Diagnostic Studies Discussed/ Reviewed: LHC, CTA-left liver lobe enlargement, right renal cyst, EKG-RBBB, carotid duplex WNL, PAT labs WNL with exception of A1C 6.4%, CXR WNL    Procedure planning details:   Valve Size: 29mm  Cardiology sheath access: Left femoral  CT Surgery sheath access: Right femoral    Post Procedure Considerations: Bleeding at groin sites, monitor for arrhythmias and s/s of CVA, wound and overall healing with BMI of 38 and A1C 6.4%

## 2023-02-23 NOTE — ANESTHESIA PROCEDURE NOTES
Emergent/Open-Heart Anesthesia TEODORO    Procedure Performed: Emergent/Open-Heart Anesthesia TEODORO       Start Time:  2/23/2023 7:11 AM       End Time:      Preanesthesia Checklist:  Patient identified, IV assessed, risks and benefits discussed, monitors and equipment assessed, procedure being performed at surgeon's request and anesthesia consent obtained.    General Procedure Information  Diagnostic Indications for Echo:  assessment of surgical repair  Physician Requesting Echo: Clemente Barger MD  Location performed:  OR  Intubated  Bite block placed  Heart visualized  Probe Insertion:  Easy  Probe Type:  Multiplane  Modalities:  2D only, color flow mapping, continuous wave Doppler and pulse wave Doppler    Echocardiographic and Doppler Measurements    Ventricles    Right Ventricle:  Cavity size normal.  Hypertrophy not present.  Thrombus not present.  Global function normal.    Left Ventricle:  Cavity size normal.  Thrombus not present.  Global Function normal.  Ejection Fraction 60%.      Ventricular Regional Function:  1- Basal Anteroseptal:  normal  2- Basal Anterior:  normal  3- Basal Anterolateral:  normal  4- Basal Inferolateral:  normal  5- Basal Inferior:  normal  6- Basal Inferoseptal:  normal  7- Mid Anteroseptal:  normal  8- Mid Anterior:  normal  9- Mid Anterolateral:  normal  10- Mid Inferolateral:  normal  11- Mid Inferior:  normal  12- Mid Inferoseptal:  normal  13- Apical Anterior:  normal  14- Apical Lateral:  normal  15- Apical Inferior:  normal  16- Apical Septal:  normal  17- [place]:  normal      Valves    Aortic Valve:  Annulus calcified.  Stenosis severe.  Area: 0.95 cm².  Mean Gradient: 31/22 mmHg.  Regurgitation absent.  Leaflets calcified.  Leaflet motions restricted.      Mitral Valve:  Annulus normal.  Stenosis not present.  Regurgitation mild.  Leaflets normal.  Leaflet motions normal.      Tricuspid Valve:  Annulus normal.  Stenosis not present.  Regurgitation mild.  Leaflets normal.   Leaflet motions normal.    Pulmonic Valve:  Annulus normal.  Stenosis not present.  Regurgitation trace.        Aorta    Ascending Aorta:  Size normal.  Dissection not present.  Plaque thickness less than 3 mm.  Mobile plaque not present.    Aortic Arch:  Size normal.  Dissection not present.  Plaque thickness less than 3 mm.  Mobile plaque not present.    Descending Aorta:  Size normal.  Dissection not present.  Plaque thickness less than 3 mm.  Mobile plaque not present.        Atria    Right Atrium:  Size normal.  Spontaneous echo contrast not present.  Thrombus not present.  Tumor not present.  Device not present.      Left Atrium:  Size normal.  Spontaneous echo contrast not present.  Thrombus not present.  Tumor not present.  Device not present.    Left atrial appendage normal.      Septa        Ventricular Septum:  Intra-ventricular septum morphology normal.          Other Findings  Pericardium:  normal  Pleural Effusion:  none  Pulmonary Venous Flow:  normal    Anesthesia Information  Performed Personally  Anesthesiologist:  Megan Cruz MD      Echocardiogram Comments:       Findings discussed with TAVR team    Pre TAVR  Normal RV and LV systolic fxn. Mild LVH    Mild TR, mild MR, DAWIT by continuity 0.95cm2. pk/mn gradient 31/22mmHg.  AV tricuspid  Aorta grade 2-3/5 atheroma.      Post TAVR #29mm valve  RV and LV systolic fxn preserved. No change to MV and TV, trivial paravalvular leak noted. Pk/mn gradient through valve 7/4 mmHG  DAWIT by continuity 3.57cm2.  no pericardial effusion, Aorta intact.

## 2023-02-24 ENCOUNTER — APPOINTMENT (OUTPATIENT)
Dept: CARDIOLOGY | Facility: HOSPITAL | Age: 58
DRG: 267 | End: 2023-02-24
Payer: COMMERCIAL

## 2023-02-24 ENCOUNTER — APPOINTMENT (OUTPATIENT)
Dept: MRI IMAGING | Facility: HOSPITAL | Age: 58
DRG: 267 | End: 2023-02-24
Payer: COMMERCIAL

## 2023-02-24 VITALS
BODY MASS INDEX: 37.91 KG/M2 | SYSTOLIC BLOOD PRESSURE: 142 MMHG | DIASTOLIC BLOOD PRESSURE: 96 MMHG | OXYGEN SATURATION: 99 % | HEART RATE: 78 BPM | RESPIRATION RATE: 14 BRPM | HEIGHT: 74 IN | WEIGHT: 295.42 LBS | TEMPERATURE: 98.4 F

## 2023-02-24 DIAGNOSIS — R09.89 SUSPECTED CEREBROVASCULAR ACCIDENT (CVA): Primary | ICD-10-CM

## 2023-02-24 LAB
ACT BLD: 143 SECONDS (ref 82–152)
ACT BLD: 347 SECONDS (ref 82–152)
ANION GAP SERPL CALCULATED.3IONS-SCNC: 9 MMOL/L (ref 5–15)
ASCENDING AORTA: 3.7 CM
BH BB BLOOD EXPIRATION DATE: NORMAL
BH BB BLOOD EXPIRATION DATE: NORMAL
BH BB BLOOD TYPE BARCODE: 6200
BH BB BLOOD TYPE BARCODE: 6200
BH BB DISPENSE STATUS: NORMAL
BH BB DISPENSE STATUS: NORMAL
BH BB PRODUCT CODE: NORMAL
BH BB PRODUCT CODE: NORMAL
BH BB UNIT NUMBER: NORMAL
BH BB UNIT NUMBER: NORMAL
BH CV ECHO MEAS - AO MAX PG: 24.8 MMHG
BH CV ECHO MEAS - AO MEAN PG: 13.2 MMHG
BH CV ECHO MEAS - AO ROOT DIAM: 3.5 CM
BH CV ECHO MEAS - AO V2 MAX: 248.7 CM/SEC
BH CV ECHO MEAS - AO V2 VTI: 45.1 CM
BH CV ECHO MEAS - AVA(I,D): 1.86 CM2
BH CV ECHO MEAS - EDV(CUBED): 113 ML
BH CV ECHO MEAS - EDV(MOD-SP4): 115 ML
BH CV ECHO MEAS - EF(MOD-SP4): 55.7 %
BH CV ECHO MEAS - ESV(CUBED): 34.9 ML
BH CV ECHO MEAS - ESV(MOD-SP4): 51 ML
BH CV ECHO MEAS - FS: 32.4 %
BH CV ECHO MEAS - IVS/LVPW: 0.97 CM
BH CV ECHO MEAS - IVSD: 1.37 CM
BH CV ECHO MEAS - LA DIMENSION: 4.6 CM
BH CV ECHO MEAS - LV DIASTOLIC VOL/BSA (35-75): 45 CM2
BH CV ECHO MEAS - LV MASS(C)D: 275.5 GRAMS
BH CV ECHO MEAS - LV MAX PG: 6 MMHG
BH CV ECHO MEAS - LV MEAN PG: 3.2 MMHG
BH CV ECHO MEAS - LV SYSTOLIC VOL/BSA (12-30): 19.9 CM2
BH CV ECHO MEAS - LV V1 MAX: 122.3 CM/SEC
BH CV ECHO MEAS - LV V1 VTI: 24 CM
BH CV ECHO MEAS - LVIDD: 4.8 CM
BH CV ECHO MEAS - LVIDS: 3.3 CM
BH CV ECHO MEAS - LVOT AREA: 3.5 CM2
BH CV ECHO MEAS - LVOT DIAM: 2.11 CM
BH CV ECHO MEAS - LVPWD: 1.42 CM
BH CV ECHO MEAS - SI(MOD-SP4): 25 ML/M2
BH CV ECHO MEAS - SV(LVOT): 83.7 ML
BH CV ECHO MEAS - SV(MOD-SP4): 64 ML
BUN SERPL-MCNC: 13 MG/DL (ref 6–20)
BUN/CREAT SERPL: 19.7 (ref 7–25)
CALCIUM SPEC-SCNC: 8.5 MG/DL (ref 8.6–10.5)
CHLORIDE SERPL-SCNC: 106 MMOL/L (ref 98–107)
CO2 SERPL-SCNC: 25 MMOL/L (ref 22–29)
CREAT SERPL-MCNC: 0.66 MG/DL (ref 0.76–1.27)
CROSSMATCH INTERPRETATION: NORMAL
CROSSMATCH INTERPRETATION: NORMAL
DEPRECATED RDW RBC AUTO: 47.3 FL (ref 37–54)
EGFRCR SERPLBLD CKD-EPI 2021: 108.7 ML/MIN/1.73
ERYTHROCYTE [DISTWIDTH] IN BLOOD BY AUTOMATED COUNT: 14.4 % (ref 12.3–15.4)
GLUCOSE BLDC GLUCOMTR-MCNC: 113 MG/DL (ref 70–130)
GLUCOSE BLDC GLUCOMTR-MCNC: 125 MG/DL (ref 70–130)
GLUCOSE SERPL-MCNC: 114 MG/DL (ref 65–99)
HCT VFR BLD AUTO: 37.8 % (ref 37.5–51)
HGB BLD-MCNC: 12.6 G/DL (ref 13–17.7)
MAGNESIUM SERPL-MCNC: 2.2 MG/DL (ref 1.6–2.6)
MAXIMAL PREDICTED HEART RATE: 162 BPM
MCH RBC QN AUTO: 30.1 PG (ref 26.6–33)
MCHC RBC AUTO-ENTMCNC: 33.3 G/DL (ref 31.5–35.7)
MCV RBC AUTO: 90.2 FL (ref 79–97)
PLATELET # BLD AUTO: 199 10*3/MM3 (ref 140–450)
PMV BLD AUTO: 9.4 FL (ref 6–12)
POTASSIUM SERPL-SCNC: 3.5 MMOL/L (ref 3.5–5.2)
QT INTERVAL: 454 MS
QTC INTERVAL: 482 MS
RBC # BLD AUTO: 4.19 10*6/MM3 (ref 4.14–5.8)
SODIUM SERPL-SCNC: 140 MMOL/L (ref 136–145)
STRESS TARGET HR: 138 BPM
UNIT  ABO: NORMAL
UNIT  ABO: NORMAL
UNIT  RH: NORMAL
UNIT  RH: NORMAL
WBC NRBC COR # BLD: 12.69 10*3/MM3 (ref 3.4–10.8)

## 2023-02-24 PROCEDURE — 99231 SBSQ HOSP IP/OBS SF/LOW 25: CPT | Performed by: THORACIC SURGERY (CARDIOTHORACIC VASCULAR SURGERY)

## 2023-02-24 PROCEDURE — 70544 MR ANGIOGRAPHY HEAD W/O DYE: CPT

## 2023-02-24 PROCEDURE — 93325 DOPPLER ECHO COLOR FLOW MAPG: CPT

## 2023-02-24 PROCEDURE — 97162 PT EVAL MOD COMPLEX 30 MIN: CPT

## 2023-02-24 PROCEDURE — 97166 OT EVAL MOD COMPLEX 45 MIN: CPT

## 2023-02-24 PROCEDURE — 85027 COMPLETE CBC AUTOMATED: CPT | Performed by: PHYSICIAN ASSISTANT

## 2023-02-24 PROCEDURE — 80048 BASIC METABOLIC PNL TOTAL CA: CPT | Performed by: PHYSICIAN ASSISTANT

## 2023-02-24 PROCEDURE — 93321 DOPPLER ECHO F-UP/LMTD STD: CPT

## 2023-02-24 PROCEDURE — 70551 MRI BRAIN STEM W/O DYE: CPT

## 2023-02-24 PROCEDURE — 93010 ELECTROCARDIOGRAM REPORT: CPT | Performed by: INTERNAL MEDICINE

## 2023-02-24 PROCEDURE — 94799 UNLISTED PULMONARY SVC/PX: CPT

## 2023-02-24 PROCEDURE — 93005 ELECTROCARDIOGRAM TRACING: CPT | Performed by: PHYSICIAN ASSISTANT

## 2023-02-24 PROCEDURE — 92523 SPEECH SOUND LANG COMPREHEN: CPT

## 2023-02-24 PROCEDURE — 83735 ASSAY OF MAGNESIUM: CPT | Performed by: THORACIC SURGERY (CARDIOTHORACIC VASCULAR SURGERY)

## 2023-02-24 PROCEDURE — 99232 SBSQ HOSP IP/OBS MODERATE 35: CPT | Performed by: INTERNAL MEDICINE

## 2023-02-24 PROCEDURE — 82962 GLUCOSE BLOOD TEST: CPT

## 2023-02-24 PROCEDURE — 93308 TTE F-UP OR LMTD: CPT

## 2023-02-24 PROCEDURE — 99222 1ST HOSP IP/OBS MODERATE 55: CPT | Performed by: PSYCHIATRY & NEUROLOGY

## 2023-02-24 PROCEDURE — G0108 DIAB MANAGE TRN  PER INDIV: HCPCS

## 2023-02-24 PROCEDURE — 94761 N-INVAS EAR/PLS OXIMETRY MLT: CPT

## 2023-02-24 PROCEDURE — 25010000002 SULFUR HEXAFLUORIDE MICROSPH 60.7-25 MG RECONSTITUTED SUSPENSION

## 2023-02-24 RX ORDER — ASPIRIN 300 MG/1
300 SUPPOSITORY RECTAL DAILY
Status: DISCONTINUED | OUTPATIENT
Start: 2023-02-25 | End: 2023-02-24

## 2023-02-24 RX ORDER — CLOPIDOGREL BISULFATE 75 MG/1
75 TABLET ORAL DAILY
Status: DISCONTINUED | OUTPATIENT
Start: 2023-02-25 | End: 2023-02-24 | Stop reason: HOSPADM

## 2023-02-24 RX ORDER — ASPIRIN 81 MG/1
81 TABLET, CHEWABLE ORAL DAILY
Status: DISCONTINUED | OUTPATIENT
Start: 2023-02-25 | End: 2023-02-24 | Stop reason: HOSPADM

## 2023-02-24 RX ORDER — SODIUM CHLORIDE 0.9 % (FLUSH) 0.9 %
10 SYRINGE (ML) INJECTION AS NEEDED
Status: DISCONTINUED | OUTPATIENT
Start: 2023-02-24 | End: 2023-02-24 | Stop reason: HOSPADM

## 2023-02-24 RX ORDER — CLOPIDOGREL BISULFATE 75 MG/1
75 TABLET ORAL DAILY
Qty: 30 TABLET | Refills: 0 | Status: SHIPPED | OUTPATIENT
Start: 2023-02-25 | End: 2023-03-17 | Stop reason: ALTCHOICE

## 2023-02-24 RX ORDER — ASPIRIN 81 MG/1
81 TABLET, CHEWABLE ORAL DAILY
Qty: 30 TABLET | Refills: 0 | Status: SHIPPED | OUTPATIENT
Start: 2023-02-25

## 2023-02-24 RX ORDER — SODIUM CHLORIDE 0.9 % (FLUSH) 0.9 %
10 SYRINGE (ML) INJECTION EVERY 12 HOURS SCHEDULED
Status: DISCONTINUED | OUTPATIENT
Start: 2023-02-24 | End: 2023-02-24 | Stop reason: HOSPADM

## 2023-02-24 RX ORDER — SODIUM CHLORIDE 9 MG/ML
40 INJECTION, SOLUTION INTRAVENOUS AS NEEDED
Status: DISCONTINUED | OUTPATIENT
Start: 2023-02-24 | End: 2023-02-24

## 2023-02-24 RX ORDER — ATORVASTATIN CALCIUM 80 MG/1
80 TABLET, FILM COATED ORAL NIGHTLY
Qty: 90 TABLET | Refills: 3 | Status: SHIPPED | OUTPATIENT
Start: 2023-02-24

## 2023-02-24 RX ORDER — ATORVASTATIN CALCIUM 40 MG/1
80 TABLET, FILM COATED ORAL NIGHTLY
Status: DISCONTINUED | OUTPATIENT
Start: 2023-02-24 | End: 2023-02-24 | Stop reason: HOSPADM

## 2023-02-24 RX ORDER — CLOPIDOGREL BISULFATE 75 MG/1
300 TABLET ORAL ONCE
Status: COMPLETED | OUTPATIENT
Start: 2023-02-24 | End: 2023-02-24

## 2023-02-24 RX ADMIN — IPRATROPIUM BROMIDE AND ALBUTEROL SULFATE 3 ML: 2.5; .5 SOLUTION RESPIRATORY (INHALATION) at 08:21

## 2023-02-24 RX ADMIN — FUROSEMIDE 40 MG: 40 TABLET ORAL at 08:38

## 2023-02-24 RX ADMIN — AMLODIPINE BESYLATE 5 MG: 5 TABLET ORAL at 08:38

## 2023-02-24 RX ADMIN — ASPIRIN 81 MG: 81 TABLET, COATED ORAL at 08:38

## 2023-02-24 RX ADMIN — CLOPIDOGREL BISULFATE 300 MG: 75 TABLET ORAL at 12:26

## 2023-02-24 RX ADMIN — PANTOPRAZOLE SODIUM 40 MG: 40 TABLET, DELAYED RELEASE ORAL at 05:19

## 2023-02-24 RX ADMIN — VALSARTAN 80 MG: 80 TABLET, FILM COATED ORAL at 08:38

## 2023-02-24 RX ADMIN — CETIRIZINE HYDROCHLORIDE 10 MG: 10 TABLET, FILM COATED ORAL at 08:38

## 2023-02-24 RX ADMIN — IPRATROPIUM BROMIDE AND ALBUTEROL SULFATE 3 ML: 2.5; .5 SOLUTION RESPIRATORY (INHALATION) at 12:45

## 2023-02-24 RX ADMIN — POTASSIUM CHLORIDE 10 MEQ: 10 CAPSULE, COATED, EXTENDED RELEASE ORAL at 08:38

## 2023-02-24 RX ADMIN — NICARDIPINE HYDROCHLORIDE 5 MG/HR: 2.5 INJECTION, SOLUTION INTRAVENOUS at 02:57

## 2023-02-24 RX ADMIN — SULFUR HEXAFLUORIDE 3 ML: KIT at 11:00

## 2023-03-02 ENCOUNTER — TELEPHONE (OUTPATIENT)
Dept: CARDIOLOGY | Facility: CLINIC | Age: 58
End: 2023-03-02
Payer: COMMERCIAL

## 2023-03-02 NOTE — TELEPHONE ENCOUNTER
Contacted patient after TAVR on 2/23/23. No concerns post-procedure. Groin sites are MALACHI and healing appropriately. No erythema or drainage, bruising is starting to resolved. Appetite appropriate, no B/B concerns. Has been walking daily. Discussed release from driving and lifting restrictions today. He would like to return to work as soon as he can, will discuss with team and call him back with this information.   Discussed apt with RICKI MONTERO on 3/15, his apt with ROSARIO/Dr. Guan is scheduled for 3/28, will likely have echo at that time.

## 2023-03-09 NOTE — DISCHARGE SUMMARY
Discharge Summary Transcatheter Valve Replacement    Date of Admission: 2/23/2023  Date of Discharge: 2/24/2023    PCP: Joe Kaminski MD  ATTENDING: Clemente Barger MD    Consults:   Consulting Physician(s)     Provider Relationship Specialty    Alyson You MD Consulting Physician Pulmonary Disease    Edwin Guan MD Consulting Physician Cardiology    Andrei Guallpa MD Consulting Physician Neurology          Structural Heart Team  1.  Clemente Barger MD  2.  Jorge Hummel MD  3.  Matty Paz MD  4.  Ac Paul MD  5..  Isi Mccracken MD  6.  Kay Kc MD  7.  Edwin Guan MD  8.  KYLAH Lamar    Presenting Problem/ HPI: The patient is a 57-year-old male everyday smoker who is referred for aortic valve stenosis. 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  Recently, he exhibited shortness of breath.  He has been short of breath since that time.  He was seen in ED at [facility] and had an echocardiogram, which revealed a V-max of 420 cm second.  He had evidence of severe aortic stenosis.  He has been evaluated by Dr. Guan and had a cardiac catheterization as well as a TEODORO.  This confirms severe aortic stenosis.  He has noncritical coronary artery disease and has been referred to me for evaluation for TAVR.    Discharge Diagnosis:     Aortic stenosis     Essential hypertension    T2DM     Hyperlipidemia    GERD     Class 2 obesity in adult    Tobacco use    Suspected cerebrovascular accident (CVA)      Procedures Performed:   Procedure(s):  TRANSCATHETER AORTIC VALVE REPLACEMENT,TEODORO  Transfemoral Transcatheter Aortic Valve Replacement    Hospital Course:The patient is an 57 y.o. male from with symptoms of severe aortic stenosis as noted above in the HPI.  An echocardiogram revealed the following:    Echocardiogram Findings    Left Ventricle Left ventricular systolic function is normal. Estimated left ventricular  EF = 60%     Normal left ventricular cavity size and wall thickness noted. All left ventricular wall segments contract normally.  Right Ventricle Normal right ventricular cavity size, wall thickness, systolic function and septal motion noted.  Left Atrium Normal left atrial size and volume noted. Left atrial appendage was found to be singularly lobar in nature.  The left atrial appendage was visualized through multiple planes. Doppler interrogation shows normal flow within the left atrial appendage. No evidence of a left atrial appendage thrombus was present.  Right Atrium Normal right atrial cavity size noted.  Aortic Valve The aortic valve is abnormal in structure. The aortic valve exhibits sclerosis. There is calcification of the aortic valve. The aortic valve appears trileaflet. No aortic valve regurgitation is present. Severe aortic valve stenosis is present.  Mitral Valve The mitral valve is structurally normal with no significant stenosis present. Mild mitral valve regurgitation is present.  Tricuspid Valve The tricuspid valve is structurally normal with no stenosis present. Trace tricuspid valve regurgitation is present.  Pulmonic Valve The pulmonic valve is structurally normal with no significant stenosis present. There is no significant pulmonic valve regurgitation present.  Greater Vessels No dilation of the aortic root is present. No dilation of the sinuses of Valsalva is present.  Pericardium The pericardium is normal. There is no evidence of pericardial effusion. .          Procedures Performed:   Procedure(s):  TRANSCATHETER AORTIC VALVE REPLACEMENT,TEODORO  Transfemoral Transcatheter Aortic Valve Replacement    Hospital Course:The patient is an 57 y.o. male from with symptoms of severe aortic stenosis as noted above in the HPI.  An echocardiogram revealed the following:      The patient was evaluated and deemed to be at greater risk of mortality with traditional open AVR primarily based upon physical  exam, laboratory studies, and imaging findings.   The patient was admitted the morning of the scheduled OR procedure.    2/23: Patient was taken to the operating room for TAVR with Dr Barger and Dr Guan. An Crouch Dylon #29 aoritc tissue valve was placed.  Details of the operation can be found in a separate operative note. At the end of the procedure, bilateral pedal pulses were in tact.  The patient was extubated and taken transferred to the ICU in stable condition.  2/24: Patient recovering well, groins without hematoma.  Patient able to ambulated and void without issues.  Patient planning for DC home. However, prior to dc he was noted to have new onset right eye pain and blurred vision.  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  Neurology recommended eye patching, plavix, DAPT for 3 weeks, holter monitor upon dc and outpatient follow-up in eye and stroke clinic. He was still stable for dc home according to neurology and cardiology.  Patient discharged home with specific wound care instructions and follow up appointments scheduled.       Discharge Disposition  Home or Self Care    Discharge Medications     Discharge Medications      New Medications      Instructions Start Date   clopidogrel 75 MG tablet  Commonly known as: PLAVIX   75 mg, Oral, Daily         Changes to Medications      Instructions Start Date   aspirin 81 MG EC tablet  What changed: Another medication with the same name was added. Make sure you understand how and when to take each.   81 mg, Oral, Daily      aspirin 81 MG chewable tablet  What changed: You were already taking a medication with the same name, and this prescription was added. Make sure you understand how and when to take each.   81 mg, Oral, Daily      atorvastatin 80 MG tablet  Commonly known as: LIPITOR  What changed:   · medication  strength  · how much to take   80 mg, Oral, Nightly         Continue These Medications      Instructions Start Date   albuterol sulfate  (90 Base) MCG/ACT inhaler  Commonly known as: PROVENTIL HFA;VENTOLIN HFA;PROAIR HFA   2 puffs, Inhalation, Every 4 Hours PRN      amLODIPine 5 MG tablet  Commonly known as: NORVASC   5 mg, Oral, Daily      bisoprolol 5 MG tablet  Commonly known as: ZEBeta   2.5 mg, Oral, Nightly      furosemide 20 MG tablet  Commonly known as: LASIX   20 mg, Oral, Daily      loratadine 10 MG tablet  Commonly known as: CLARITIN   10 mg, Oral, Daily      omeprazole 20 MG capsule  Commonly known as: priLOSEC   20 mg, Oral, Daily      potassium chloride 10 MEQ CR capsule  Commonly known as: MICRO-K   1 capsule, Oral, Daily      valsartan 80 MG tablet  Commonly known as: DIOVAN   80 mg, Oral, Daily             Discharge Diet:   Diet Instructions     Diet: Consistent Carbohydrate, Cardiac      Discharge Diet:  Consistent Carbohydrate  Cardiac             Activity at Discharge:   Activity Instructions     Bathing Restrictions      Showering is permitted. No tub baths, swimming pools, hot tubs until your surgeon approves.    Type of Restriction: Bathing    Bathing Restrictions: No Tub Bath    Driving Restrictions      Type of Restriction: Driving    Driving Restrictions: No Driving (Time Limited)    Length: 1 Week    Lifting Restrictions      Type of Restriction: Lifting    Lifting Restrictions: Other    Explain Lifing Restrictions: Do not lift anything heavier than 10 pounds for 1 week (a gallon of milk weighs 8 pounds).    Other Activity Instructions      Walking is one of the best ways to get   stronger after your TAVR. Start with a 5  minute walk 3-4 times a day. Walk a little   more each day.     Climbing stairs at a slow pace is okay   Additional Activity Instructions:    No driving until cleared by CT surgery, Cardiology, Neurology, or primary care due to double vision               Special  Instructions Following Valve Procedure   • Check incision/groin sites daily. Clean daily with a clean washcloth, soap and water. Pat dry. Do not scrub. Wear loose fitting clothing over groin incision site until healed.  • Shower:  May shower daily, but no tub baths, hot tubs or pools until Cardiac (CT) Surgeon approves.   • Walk daily starting with a 5 minute walk four times daily.  Increase walking by 1-2 minutes per walk as tolerated. No strenuous activity until CT Surgeon approves.   • No lifting over 10 lbs for 7 days.  • No driving for 7 days unless your physician tells you otherwise.   • Heart valve infection: Tell your doctors/dentists that your heart valve has been replaced before any procedures or dental work. You will be given a special wallet card at discharge to show your doctor/dentist. The card provides recommendations on when antibiotics are needed and the dose.    • Dental care: Reduce your risk of heart valve infection by brushing your teeth twice a day, using dental floss and seeing your dentist at least twice a year.   • Monitor your heart rate, blood pressure and weight. Weigh yourself first thing in the morning wearing similar weight clothing. Report a weight gain of 3 pounds or more in a 24 hour period or 5 pounds in one week to your CT surgeon. Record your numbers and bring to your doctor appointments.  • Report groin/incision site redness, drainage, swelling and/or significant tenderness, leg/feet swelling, chills and/or fever of 101 degrees at anytime or 100 for more than 24 hours, chest pain or increased shortness of breath to [facility] CT Surgery at [phone].   • Call CT Surgery Office for all questions or concerns at [phone].     Follow-up Appointments  Future Appointments   Date Time Provider Department Center   3/10/2023  2:30 PM [facility]   3/15/2023 12:30 PM Zoe Dave APRN [facility]     Additional Instructions for the Follow-ups  that You Need to Schedule     Cardiac Rehab Evaluation and Enrollment   Mar 01, 2023      Reason for Consult: Education         Ambulatory Referral to Occupational Therapy   As directed      Follow-up needed: Yes         Ambulatory Referral to Physical Therapy   As directed      Follow-up needed: Yes         Call MD With Problems / Concerns   As directed      Monitor your heart rate, blood pressure,   and weight. Weigh yourself first thing in   the morning wearing similar weight   clothing. If you gain 3 pounds or more in   24 hours or 5 pounds or more in one   week, call your cardiac surgeon. Record   your numbers and bring to your doctor   appointments.   Call the cardiac surgery office for   groin/incision site redness, drainage,   swelling and/or significant tenderness,   leg swelling, chills and/or fever of 101   degrees or higher at any time or 100   degrees for more than 24 hours.    If you have any of the following   CALL 911- Don't Drive   ~Chest pain or trouble breathing  ~Sudden numbness or weakness in your   face, arms, or legs   ~Shortness of breath that doesn't get better   when you rest  ~Heart rate faster than 120 beats per minute   with shortness of breath  ~Heart rate lower than 50 beats per minute   or a new irregular heart rate  ~Bowel movement that is dark black or   bright red    Order Comments: Monitor your heart rate, blood pressure, and weight. Weigh yourself first thing in the morning wearing similar weight clothing. If you gain 3 pounds or more in 24 hours or 5 pounds or more in one week, call your cardiac surgeon. Record your numbers and bring to your doctor appointments. Call the cardiac surgery office for groin/incision site redness, drainage, swelling and/or significant tenderness, leg swelling, chills and/or fever of 101 degrees or higher at any time or 100 degrees for more than 24 hours.  If you have any of the following CALL 911- Don't Drive ~Chest pain or trouble breathing ~Sudden  numbness or weakness in your face, arms, or legs ~Shortness of breath that doesn't get better when you rest ~Heart rate faster than 120 beats per minute with shortness of breath ~Heart rate lower than 50 beats per minute or a new irregular heart rate ~Bowel movement that is dark black or bright red                Time spent for discharge: 30 minutes    Thank you for allowing the [facility] Structural Heart Team to participate in your care.    If you have any questions or concerns please call:     Cardiothoracic Surgery   Dr. Barger/Estephanie/Jackson/Beverly at [phone]    Cardiology  Dr. Kc/Kyakay at [phone]    Dr. Guan at [phone]    Mikki Scruggs PA-C  03/09/23  14:39 EST

## 2023-03-10 ENCOUNTER — HOSPITAL ENCOUNTER (OUTPATIENT)
Dept: DIABETES SERVICES | Facility: HOSPITAL | Age: 58
Setting detail: RECURRING SERIES
Discharge: HOME | End: 2023-03-10
Payer: COMMERCIAL

## 2023-03-10 NOTE — CONSULTS
Mr. Wolfe attended a 45 minute diabetes stroke follow up class in person today. No charge is associated with this visit.  He states he is doing well. He states he hasn't had a meter at home to monitor glucoses. He was provided a donated Contour Next meter and demonstrated use. He was able to teach back. We discussed fasting and postprandial goals. We reviewed the importance of glucose management and the ABC's of diabetes care.We reviewed his A1c and discussed it's significance. We discussed the FAST acronym and calling 911 immediately. We reviewed limiting salt, saturated fats, and simple carbohydrates. He was encouraged to drink more water, be active for 150 minutes per week, and to limit regular soda. He agreed and states he will continue working on improving diet. Mr. Wolfe was provided our contact information for future questions. Thank you.

## 2023-03-14 NOTE — PROGRESS NOTES
..       [facility] Follow Up Note    Date of Encounter: 03/15/2023     Name: John Wolfe  : 1965     Referred By: No ref. provider found  PCP: Joe Kaminski MD    Chief Complaint:    Chief Complaint   Patient presents with   • Follow-up     Patient presents in follow up - s/p TAVR 2023.  He has been doing well.         Subjective      History of Present Illness:    It was nice to see John Wolfe in follow up.  He is a pleasant 57 y.o. male with PMH significant for HTN, DM II, HLD, GERD, current tobacco abuse, and aortic stenosis.      Patient is s/p TAVR by Dr. Barger on 2023.  See op report for full details.  He overall did well post-operatively.  On POD #1 prior to discharge patient did experience acute right eye pain with vision changes.  Neurology was consulted and he underwent an MRI which showed a small acute stroke in the left cerebellum which was interpreted by neurology to likely be due to embolic showering.  Neurology recommended eye patching, Plavix, DAPT for 3 weeks, holter monitor upon discharge and outpatient follow-up in eye and stroke clinic.  Patient was deemed stable for discharge home.  Without re-admission.  He was discharged on Plavix, aspirin, and statin therapy.     Mr. Wolfe presents to office today for initial post-op follow-up.  Patient denies fever or chills.  He reports dyspnea with exertion but denies other respiratory associated symptoms.  Patient states his vision in the right eye continues to improve.  He denies further issue. Patient reports some LLE swelling that seems to resolve with elevation.  Reports scheduled follow-up with PCP and Cardiology.  Reports medication compliance.  Patient does continue to smoke.     Review of Systems:  Review of Systems   Constitutional: Negative. Negative for chills, decreased appetite, diaphoresis, fever, malaise/fatigue, night sweats, weight gain and weight loss.   HENT:  Positive for congestion. Negative for hoarse voice.    Eyes: Positive for blurred vision (Right eye). Negative for double vision and visual disturbance.   Cardiovascular: Positive for dyspnea on exertion and leg swelling (LLE). Negative for chest pain, claudication, irregular heartbeat, near-syncope, orthopnea, palpitations, paroxysmal nocturnal dyspnea and syncope.   Respiratory: Positive for cough. Negative for hemoptysis, shortness of breath, sputum production and wheezing.    Endocrine: Negative.    Hematologic/Lymphatic: Negative.  Negative for adenopathy and bleeding problem. Does not bruise/bleed easily.   Skin: Negative.  Negative for color change, nail changes, poor wound healing and rash.   Musculoskeletal: Negative.  Negative for back pain, falls and muscle cramps.   Gastrointestinal: Negative.  Negative for abdominal pain, dysphagia and heartburn.   Genitourinary: Negative.  Negative for flank pain.   Neurological: Negative.  Negative for brief paralysis, disturbances in coordination, dizziness, focal weakness, headaches, light-headedness, loss of balance, numbness, paresthesias, sensory change, vertigo and weakness.   Psychiatric/Behavioral: Negative.  Negative for depression and suicidal ideas.   Allergic/Immunologic: Negative for persistent infections.       I have reviewed the following portions of the patient's history: allergies, current medications, past family history, past medical history, past social history, past surgical history and problem list and confirm it's accurate.    Allergies:  Allergies   Allergen Reactions   • Lidocaine Hcl-Sodium Chloride Hives   • Ceclor [Cefaclor] Rash       Medications:      Current Outpatient Medications:   •  amLODIPine (NORVASC) 5 MG tablet, Take 1 tablet by mouth Daily., Disp: , Rfl:   •  atorvastatin (LIPITOR) 80 MG tablet, Take 1 tablet by mouth Every Night., Disp: 90 tablet, Rfl: 3  •  bisoprolol (ZEBeta) 5 MG tablet, Take 2.5 mg by mouth Every Night.,  Disp: , Rfl:   •  clopidogrel (PLAVIX) 75 MG tablet, Take 1 tablet by mouth Daily., Disp: 30 tablet, Rfl: 0  •  esomeprazole (nexIUM) 40 MG capsule, Take 1 capsule by mouth Every Morning Before Breakfast., Disp: , Rfl:   •  furosemide (LASIX) 20 MG tablet, Take 1 tablet by mouth Daily., Disp: , Rfl:   •  loratadine (CLARITIN) 10 MG tablet, Take 1 tablet by mouth Daily., Disp: , Rfl:   •  valsartan (DIOVAN) 80 MG tablet, Take 1 tablet by mouth Daily., Disp: , Rfl:   •  aspirin 81 MG chewable tablet, Chew 1 tablet Daily., Disp: 30 tablet, Rfl: 0  No current facility-administered medications for this visit.    Facility-Administered Medications Ordered in Other Visits:   •  Chlorhexidine Gluconate Cloth 2 % pads 1 application, 1 application, Topical, Q12H PRN, WillMaura, APRN    History:   Past Medical History:   Diagnosis Date   • Aortic valve stenosis    • Diabetes mellitus (HCC)    • GERD (gastroesophageal reflux disease)    • Heart murmur    • Hyperlipidemia    • Hypertension        Past Surgical History:   Procedure Laterality Date   • AORTIC VALVE REPAIR/REPLACEMENT N/A 2/23/2023    Procedure: TRANSCATHETER AORTIC VALVE REPLACEMENT,TEODORO;  Surgeon: Clemente Barger MD;  Location: [facility];  Service: Cardiothoracic;  Laterality: N/A;  FLOURO  DOSE  CONTRAST   • AORTIC VALVE REPAIR/REPLACEMENT N/A 2/23/2023    Procedure: Transfemoral Transcatheter Aortic Valve Replacement;  Surgeon: Edwin Guan MD;  Location: [facility];  Service: Cardiovascular;  Laterality: N/A;   • CARDIAC CATHETERIZATION N/A 01/26/2023    Procedure: Left Heart Cath;  Surgeon: Edwin Guan MD;  Location: [facility] CATH INVASIVE LOCATION;  Service: Cardiology;  Laterality: N/A;   • CHOLECYSTECTOMY     • NASAL SEPTAL RECONSTRUCTION         Social History     Socioeconomic History   • Marital status: Significant Other   • Number of children: 1   Tobacco Use   • Smoking status: Every Day     Packs/day: 1.00      Years: 35.00     Pack years: 35.00     Types: Cigarettes   • Smokeless tobacco: Never   Vaping Use   • Vaping Use: Every day   • Substances: Nicotine, Flavoring   • Devices: Refillable tank   Substance and Sexual Activity   • Alcohol use: Yes     Comment: socially   • Drug use: Never   • Sexual activity: Defer        Family History   Problem Relation Age of Onset   • Hypertension Mother    • Hyperlipidemia Mother    • Esophageal cancer Mother    • Stroke Father    • Hypertension Father    • Hyperlipidemia Father        Objective     Physical Exam:  Vitals:    03/15/23 1037 03/15/23 1038   BP: 158/90 150/86   BP Location: Right arm Left arm   Patient Position: Sitting Sitting   Pulse: 86    Temp: 97.3 °F (36.3 °C)    SpO2: 98%    Weight: (!) 138 kg (303 lb 12.8 oz)       Body mass index is 38.99 kg/m².    Physical Exam  Vitals reviewed.   Constitutional:       General: He is not in acute distress.     Appearance: Normal appearance. He is not ill-appearing.   Eyes:      Pupils: Pupils are equal, round, and reactive to light.   Cardiovascular:      Rate and Rhythm: Normal rate and regular rhythm.      Heart sounds: Normal heart sounds. No murmur heard.  Pulmonary:      Effort: Pulmonary effort is normal.      Breath sounds: Normal breath sounds.   Musculoskeletal:      Left lower leg: Edema (trace) present.   Skin:     General: Skin is warm and dry.      Capillary Refill: Capillary refill takes less than 2 seconds.   Neurological:      Mental Status: He is alert and oriented to person, place, and time.   Psychiatric:         Mood and Affect: Mood normal.         Behavior: Behavior normal.         Thought Content: Thought content normal.         Judgment: Judgment normal.         Imaging/Labs:  2 View CXR -- 3/15/2023  Awaiting final report.  XR Chest 2 View (03/15/2023 10:59)    I personally reviewed this imaging.     Assessment / Plan      Assessment / Plan:  PMH significant for HTN, DM II, HLD, GERD, current tobacco  abuse, and aortic stenosis.      1.  Aortic Stenosis  · S/p TAVR by Dr. Barger on 2/23/2023.  See op report for full details.    · Overall did well post-operatively.    · On POD #1 prior to discharge patient did experience acute right eye pain with vision changes.    · Neurology was consulted.  MRI revealed a small acute stroke in the left cerebellum which was interpreted by neurology to likely be due to embolic showering.    · Neurology recommended eye patching, Plavix, DAPT for 3 weeks, holter monitor upon discharge and outpatient follow-up in eye and stroke clinic.    · Patient was deemed stable for discharge home.  Without re-admission.    · Discharged on Plavix, aspirin, and statin therapy.   · Presents to office today for initial post-op follow-up.    · Denies fever or chills.  Reports dyspnea with exertion but denies other respiratory associated symptoms.    · States his vision in the right eye continues to improve.  He denies further issue.   · Reports some LLE swelling that seems to resolve with elevation.    · Access site without issue.   · Reports scheduled follow-up with PCP and Cardiology.    · Reports medication compliance.    · Continues to smoke -- cessation encouraged.        Patient Education:  • No lifting over 10 lbs for 4 weeks from surgical date.   • Please watch for signs and symptoms of infection which may include but are not limited to: increased pain or tenderness around your incision, warmth at the site or fever, redness or red streaking, and foul smelling or appearing drainage.  Clear drainage and bloody drainage are not worrisome.  • You may return to work March 23, 2023.      Instructions and post-operative restrictions verbally reviewed -- patient verbalized understanding.    Follow Up:   We will plan for follow-up on an as-needed basis.    Or sooner for any further concerns or worsening sign and symptoms.  Patient encouraged to call the office with any questions or concerns.     Thank  you for allowing me to participate in your care.  Best Regards,    KYLAH Sauer  [facility]  03/15/23  12:52 EDT

## 2023-03-15 ENCOUNTER — OFFICE VISIT (OUTPATIENT)
Dept: CARDIAC SURGERY | Facility: CLINIC | Age: 58
End: 2023-03-15
Payer: COMMERCIAL

## 2023-03-15 VITALS
WEIGHT: 303.8 LBS | BODY MASS INDEX: 38.99 KG/M2 | HEART RATE: 86 BPM | DIASTOLIC BLOOD PRESSURE: 86 MMHG | SYSTOLIC BLOOD PRESSURE: 150 MMHG | OXYGEN SATURATION: 98 % | TEMPERATURE: 97.3 F

## 2023-03-15 DIAGNOSIS — Z95.2 S/P TAVR (TRANSCATHETER AORTIC VALVE REPLACEMENT): ICD-10-CM

## 2023-03-15 PROCEDURE — 71046 X-RAY EXAM CHEST 2 VIEWS: CPT | Performed by: REGISTERED NURSE

## 2023-03-15 PROCEDURE — 99212 OFFICE O/P EST SF 10 MIN: CPT | Performed by: REGISTERED NURSE

## 2023-03-15 RX ORDER — ESOMEPRAZOLE MAGNESIUM 40 MG/1
40 CAPSULE, DELAYED RELEASE ORAL
COMMUNITY

## 2023-03-16 ENCOUNTER — NURSE TRIAGE (OUTPATIENT)
Dept: CALL CENTER | Facility: HOSPITAL | Age: 58
End: 2023-03-16
Payer: COMMERCIAL

## 2023-03-16 NOTE — TELEPHONE ENCOUNTER
"    Reason for Disposition  • [1] Sinus congestion (pressure, fullness) AND [2] present > 10 days    Additional Information  • Negative: SEVERE difficulty breathing (e.g., struggling for each breath, speaks in single words)  • Negative: Sounds like a life-threatening emergency to the triager  • Negative: [1] Difficulty breathing AND [2] not from stuffy nose (e.g., not relieved by cleaning out the nose)  • Negative: Runny nose is caused by pollen or other allergies  • Negative: Cough is main symptom  • Negative: Severe sore throat  • Negative: Fever > 104 F (40 C)  • Negative: Patient sounds very sick or weak to the triager  • Negative: [1] Fever > 101 F (38.3 C) AND [2] age > 60 years  • Negative: [1] Fever > 100.0 F (37.8 C) AND [2] bedridden (e.g., nursing home patient, CVA, chronic illness, recovering from surgery)  • Negative: [1] Fever > 100.0 F (37.8 C) AND [2] diabetes mellitus or weak immune system (e.g., HIV positive, cancer chemo, splenectomy, organ transplant, chronic steroids)  • Negative: Fever present > 3 days (72 hours)  • Negative: [1] Fever returns after gone for over 24 hours AND [2] symptoms worse or not improved  • Negative: [1] Sinus pain (not just congestion) AND [2] fever  • Negative: Earache  • Negative: [1] SEVERE sore throat AND [2] present > 24 hours    Answer Assessment - Initial Assessment Questions  1. ONSET: \"When did the nasal discharge start?\"   Congestion at night  2. AMOUNT: \"How much discharge is there?\"       *No Answer*  3. COUGH: \"Do you have a cough?\" If yes, ask: \"Describe the color of your sputum\" (clear, white, yellow, green)   coughs  mostly at night when lying down  4. RESPIRATORY DISTRESS: \"Describe your breathing.\"   denies  5. FEVER: \"Do you have a fever?\" If Yes, ask: \"What is your temperature, how was it measured, and when did it start?\"    no  6. SEVERITY: \"Overall, how bad are you feeling right now?\" (e.g., doesn't interfere with normal activities, staying home " "from school/work, staying in bed)       *No Answer*  7. OTHER SYMPTOMS: \"Do you have any other symptoms?\" (e.g., sore throat, earache, wheezing, vomiting)  Sinus drainage  8. PREGNANCY: \"Is there any chance you are pregnant?\" \"When was your last menstrual period?\"      *No Answer*    Protocols used: COMMON COLD-ADULT-AH      "

## 2023-03-17 ENCOUNTER — TELEPHONE (OUTPATIENT)
Dept: NEUROLOGY | Facility: HOSPITAL | Age: 58
End: 2023-03-17
Payer: COMMERCIAL

## 2023-03-17 DIAGNOSIS — I48.0 PAROXYSMAL ATRIAL FIBRILLATION: ICD-10-CM

## 2023-03-17 DIAGNOSIS — I66.9 CEREBRAL EMBOLISM: Primary | ICD-10-CM

## 2023-03-17 NOTE — TELEPHONE ENCOUNTER
Patient's Holter with PAF. Changed therapy from Plavix to Xarelto, notified patient. Stroke clinic F/U in 4-6 weeks. Cardiology F/U.

## 2023-03-20 LAB
BH CV ECHO MEAS - AO MAX PG: 7.7 MMHG
BH CV ECHO MEAS - AO MEAN PG: 3.9 MMHG
BH CV ECHO MEAS - AO V2 MAX: 139 CM/SEC
BH CV ECHO MEAS - AO V2 VTI: 30.7 CM
BH CV ECHO MEAS - LV MAX PG: 6 MMHG
BH CV ECHO MEAS - LV MEAN PG: 2.6 MMHG
BH CV ECHO MEAS - LV V1 MAX: 122.8 CM/SEC
BH CV ECHO MEAS - LV V1 VTI: 22 CM
BH CV ECHO MEAS - LVOT DIAM: 2.3 CM
LV EF 2D ECHO EST: 55 %

## 2023-03-23 RX ORDER — ASPIRIN 81 MG/1
TABLET, CHEWABLE ORAL
Qty: 30 TABLET | Refills: 0 | OUTPATIENT
Start: 2023-03-23

## 2023-04-19 ENCOUNTER — TELEPHONE (OUTPATIENT)
Dept: CARDIOLOGY | Facility: HOSPITAL | Age: 58
End: 2023-04-19
Payer: COMMERCIAL

## 2023-04-19 NOTE — TELEPHONE ENCOUNTER
Called patient for post-TAVR [facility]. He feels well and has been able to return to work. Denies chest discomfort, SOA, dizziness. Energy level and endurance are improving, continues to have BLE edema and taking Lasix 20mg 2 PO BID. Compliant with compression socks and BLE elevation, does not add salt to his food. Had apt with Dr. Guan on 3/28 with echo, will request report. No questions or concerns today.     NYHA I  [facility] 58/70

## 2023-05-22 ENCOUNTER — OFFICE VISIT (OUTPATIENT)
Dept: NEUROLOGY | Facility: CLINIC | Age: 58
End: 2023-05-22
Payer: COMMERCIAL

## 2023-05-22 VITALS
DIASTOLIC BLOOD PRESSURE: 80 MMHG | WEIGHT: 290 LBS | OXYGEN SATURATION: 95 % | HEART RATE: 73 BPM | SYSTOLIC BLOOD PRESSURE: 120 MMHG | TEMPERATURE: 98.1 F | HEIGHT: 74 IN | BODY MASS INDEX: 37.22 KG/M2

## 2023-05-22 DIAGNOSIS — I48.0 PAROXYSMAL A-FIB: ICD-10-CM

## 2023-05-22 DIAGNOSIS — I10 ESSENTIAL HYPERTENSION: Chronic | ICD-10-CM

## 2023-05-22 DIAGNOSIS — Z72.0 TOBACCO USE: Chronic | ICD-10-CM

## 2023-05-22 DIAGNOSIS — E78.5 HYPERLIPIDEMIA LDL GOAL <70: ICD-10-CM

## 2023-05-22 DIAGNOSIS — E11.65 TYPE 2 DIABETES MELLITUS WITH HYPERGLYCEMIA, WITHOUT LONG-TERM CURRENT USE OF INSULIN: Chronic | ICD-10-CM

## 2023-05-22 DIAGNOSIS — H53.2 DIPLOPIA: ICD-10-CM

## 2023-05-22 DIAGNOSIS — R74.01 ELEVATED ALANINE AMINOTRANSFERASE (ALT) LEVEL: ICD-10-CM

## 2023-05-22 DIAGNOSIS — Z86.73 HISTORY OF STROKE: Primary | ICD-10-CM

## 2023-05-22 RX ORDER — VALSARTAN 160 MG/1
1 TABLET ORAL DAILY
COMMUNITY
Start: 2023-05-13

## 2023-05-22 RX ORDER — POTASSIUM CHLORIDE 750 MG/1
TABLET, FILM COATED, EXTENDED RELEASE ORAL
COMMUNITY
Start: 2023-04-23

## 2023-05-22 NOTE — PROGRESS NOTES
New Patient Office Visit      Encounter Date: 2023   Patient Name: John Wolfe  : 1965   MRN: [id number]   PCP: Joe Kaminski MD    Referring Provider: Andrei Guallpa MD     Chief Complaint:    Chief Complaint   Patient presents with   • Follow-up       History of Present Illness: John Wolfe is a 57 y.o. male with known medical diagnoses of essential hypertension, hyperlipidemia, diabetes mellitus type 2, coronary artery disease s/p TAVR (2023, Dr. Barger), ongoing tobacco abuse who presents to clinic today for 3-month follow-up s/p left cerebellar infarct.    Mr. Wolfe was admitted to our facility on  for TAVR procedure performed by Dr. Barger.  Postprocedure the patient developed an acute onset of diplopia prompting an MRI of the brain without contrast to be performed which revealed a left cerebellar infarct.  Stroke neurology was consulted for further evaluation and recommendations.  He was not a candidate for IV thrombolytic therapy secondary to recent cardiac surgery and was not a candidate for endovascular therapy as symptoms were inconsistent with large vessel occlusive stroke.  He was evaluated by Dr. Little who also felt that the patient had a MRI occult right brainstem infarct (posterior mare/midbrain) given his opthomalmoplegia.  The patient was discharged home on DAPT therapy, high intensity statin, and a mobile cardiac monitor.  His cardiac monitor revealed a paroxysmal atrial fibrillation therefore he was started on Xarelto 20 mg daily on 3/17/2023.    Since discharge the patient denies any new strokelike symptoms.  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  He tells me he has not been evaluated by ophthalmology however has been operating a vehicle without difficulty.  He tells me that he has remained compliant on his Xarelto 20 mg, ASA 81 mg, and atorvastatin 80 mg with no  adverse side effects.  He is independent of his ADLs and walks without an assistive device.  He denies any recent falls.  He does tell me that he has been slowly reincorporating exercise into his daily routine and is able to ride the stationary bike for 30 minutes/day.  He is hoping that increasing his physical activity also helps him with weight loss.  He does continue to smoke however tells me that he has significantly cut back.  We discussed the option of trying Wellbutrin which would aid and smoking cessation as well as weight loss.  He states he would like to think about this and talk with his primary care physician before starting.    Stroke Risk Factors: paroxysmal atrial fibrillation, diabetes mellitus, hyperlipidemia, hypertension and smoking      Subjective      Review of Systems:   Review of Systems   Constitutional: Positive for fatigue. Negative for activity change, chills and fever.   HENT: Negative.  Negative for nosebleeds and trouble swallowing.    Eyes: Positive for double vision and visual disturbance. Negative for blurred vision and photophobia.   Respiratory: Negative.  Negative for cough, chest tightness and shortness of breath.    Cardiovascular: Negative.  Negative for chest pain and palpitations.   Gastrointestinal: Negative.  Negative for blood in stool, diarrhea, nausea and vomiting.   Genitourinary: Negative.  Negative for hematuria.   Musculoskeletal: Negative.  Negative for gait problem.   Skin: Negative.    Neurological: Negative for dizziness, facial asymmetry, speech difficulty, weakness, numbness and headache.   Hematological: Negative.    Psychiatric/Behavioral: Negative.        Past Medical History:   Past Medical History:   Diagnosis Date   • Aortic valve stenosis    • Diabetes mellitus    • GERD (gastroesophageal reflux disease)    • Heart murmur    • Hyperlipidemia    • Hypertension        Past Surgical History:   Past Surgical History:   Procedure Laterality Date   • AORTIC  VALVE REPAIR/REPLACEMENT N/A 2/23/2023    Procedure: TRANSCATHETER AORTIC VALVE REPLACEMENT,TEODORO;  Surgeon: Clemente Barger MD;  Location: [facility];  Service: Cardiothoracic;  Laterality: N/A;  FLOURO  DOSE  CONTRAST   • AORTIC VALVE REPAIR/REPLACEMENT N/A 2/23/2023    Procedure: Transfemoral Transcatheter Aortic Valve Replacement;  Surgeon: Edwin Guan MD;  Location: [facility];  Service: Cardiovascular;  Laterality: N/A;   • CARDIAC CATHETERIZATION N/A 01/26/2023    Procedure: Left Heart Cath;  Surgeon: Edwin Guan MD;  Location: [facility] CATH INVASIVE LOCATION;  Service: Cardiology;  Laterality: N/A;   • CHOLECYSTECTOMY     • NASAL SEPTAL RECONSTRUCTION         Family History:   Family History   Problem Relation Age of Onset   • Hypertension Mother    • Hyperlipidemia Mother    • Esophageal cancer Mother    • Stroke Father    • Hypertension Father    • Hyperlipidemia Father        Social History:   Social History     Socioeconomic History   • Marital status: Significant Other   • Number of children: 1   Tobacco Use   • Smoking status: Every Day     Packs/day: 1.00     Years: 35.00     Pack years: 35.00     Types: Cigarettes     Passive exposure: Current   • Smokeless tobacco: Never   Vaping Use   • Vaping Use: Former   • Substances: Nicotine, Flavoring   • Devices: Refillable tank   Substance and Sexual Activity   • Alcohol use: Yes     Comment: rarely   • Drug use: Never   • Sexual activity: Defer       Medications:     Current Outpatient Medications:   •  amLODIPine (NORVASC) 5 MG tablet, Take 1 tablet by mouth Daily., Disp: , Rfl:   •  aspirin 81 MG chewable tablet, Chew 1 tablet Daily., Disp: 30 tablet, Rfl: 0  •  atorvastatin (LIPITOR) 80 MG tablet, Take 1 tablet by mouth Every Night., Disp: 90 tablet, Rfl: 3  •  bisoprolol (ZEBeta) 5 MG tablet, Take 2.5 mg by mouth Every Night., Disp: , Rfl:   •  esomeprazole (nexIUM) 40 MG capsule, Take 1 capsule by mouth Every Morning  "Before Breakfast., Disp: , Rfl:   •  furosemide (LASIX) 20 MG tablet, Take 1 tablet by mouth Daily., Disp: , Rfl:   •  loratadine (CLARITIN) 10 MG tablet, Take 1 tablet by mouth Daily., Disp: , Rfl:   •  potassium chloride 10 MEQ CR tablet, TAKE 1 TO 2 BY MOUTH ONCE DAILY AS DIRECTED, Disp: , Rfl:   •  rivaroxaban (Xarelto) 20 MG tablet, Take 1 tablet by mouth Daily With Dinner., Disp: 90 tablet, Rfl: 3  •  valsartan (DIOVAN) 160 MG tablet, Take 1 tablet by mouth Daily., Disp: , Rfl:   No current facility-administered medications for this visit.    Facility-Administered Medications Ordered in Other Visits:   •  Chlorhexidine Gluconate Cloth 2 % pads 1 application, 1 application, Topical, Q12H PRN, Maura Solis APRN    Allergies:   Allergies   Allergen Reactions   • Lidocaine Hcl-Sodium Chloride Hives   • Ceclor [Cefaclor] Rash       Objective     Physical Exam:  Vital Signs:   Vitals:    05/22/23 1038   BP: 120/80   Pulse: 73   Temp: 98.1 °F (36.7 °C)   SpO2: 95%   Weight: 132 kg (290 lb)   Height: 188 cm (74.02\")     Body mass index is 37.22 kg/m².     Physical Exam  Vitals and nursing note reviewed.   Constitutional:       General: He is not in acute distress.     Appearance: He is obese. He is not ill-appearing.   HENT:      Head: Normocephalic and atraumatic.      Mouth/Throat:      Mouth: Mucous membranes are moist.   Eyes:      Pupils: Pupils are equal, round, and reactive to light.      Comments: Disconjugate gaze, 3rd nerve palsy and right eye   Cardiovascular:      Rate and Rhythm: Normal rate and regular rhythm.      Heart sounds: Normal heart sounds.   Pulmonary:      Effort: Pulmonary effort is normal. No respiratory distress.      Breath sounds: Normal breath sounds.      Comments: On room air  Abdominal:      General: Bowel sounds are normal.   Skin:     General: Skin is warm and dry.   Neurological:      Mental Status: He is alert and oriented to person, place, and time.      Cranial Nerves: " Cranial nerve deficit present.      Sensory: No sensory deficit.      Motor: Motor strength is normal. No weakness.      Coordination: Coordination normal.   Psychiatric:         Mood and Affect: Mood normal.         Speech: Speech normal.         Behavior: Behavior normal.       Neurological Exam  Mental Status  Alert. Oriented to person, place, time and situation. Oriented to person, place, and time. Speech is normal. Language is fluent with no aphasia. Attention and concentration are normal. Fund of knowledge is appropriate for level of education.    Cranial Nerves  CN II: Vision test: Disconjugate gaze, 3rd nerve palsy and right eye. Visual fields full to confrontation.  CN III, IV, VI: Pupils equal round and reactive to light bilaterally. Disconjugate gaze and right eye, third or palsy.  CN V: Facial sensation is normal.  CN VII: Full and symmetric facial movement.  CN VIII: Hearing intact bilaterally.  CN IX, X: Palate elevates symmetrically    Motor  Normal muscle bulk throughout. No fasciculations present. Normal muscle tone. Strength is 5/5 throughout all four extremities.    Sensory  Sensation is intact to light touch, pinprick, vibration and proprioception in all four extremities.    Coordination  Right: Finger-to-nose normal. Heel-to-shin normal.Left: Finger-to-nose normal. Heel-to-shin normal.    Gait  Casual gait is normal including stance, stride, and arm swing.       NIH Stroke Scale    1a  Level of consciousness: 0=alert; keenly responsive   1b. LOC questions:  0=Answers both questions correctly    1c. LOC commands: 0=Performs both tasks correctly   2.  Best Gaze: 0=normal   3. Visual: 0=No visual loss   4. Facial Palsy: 0=Normal symmetric movement   5a. Motor left arm: 0=No drift, limb holds 90 (or 45) degrees for full 10 seconds   5b.  Motor right arm: 0=No drift, limb holds 90 (or 45) degrees for full 10 seconds   6a. Motor left le=No drift, limb holds 90 (or 45) degrees for full 10 seconds    6b  Motor right le=No drift, limb holds 90 (or 45) degrees for full 10 seconds   7. Limb Ataxia: 0=Absent   8.  Sensory: 0=Normal; no sensory loss   9. Best Language:  0=No aphasia, normal   10. Dysarthria: 0=Normal   11. Extinction and Inattention: 0=No abnormality    Total:   0         Modified Cory Score: 1; diplopia        0  No Symptoms    1 No significant disability. Able to carry out all usual activities, despite some symptoms.    2 Slight disability. Able to look after own affairs without assistance, but unable to carry out all previous activities.    3 Moderate disability. Requires some help, but able to walk unassisted.    4 Moderately severe disability. Unable to attend to own bodily needs without assistance, and unable to walk unassisted.    5 Severe disability. Requires constant nursing care and attention, bedridden, incontinent.    6 Dead        PHQ-9 Depression Screening  Little interest or pleasure in doing things? 0-->not at all   Feeling down, depressed, or hopeless? 0-->not at all   Trouble falling or staying asleep, or sleeping too much?     Feeling tired or having little energy?     Poor appetite or overeating?     Feeling bad about yourself - or that you are a failure or have let yourself or your family down?     Trouble concentrating on things, such as reading the newspaper or watching television?     Moving or speaking so slowly that other people could have noticed? Or the opposite - being so fidgety or restless that you have been moving around a lot more than usual?     Thoughts that you would be better off dead, or of hurting yourself in some way?     PHQ-9 Total Score 0   If you checked off any problems, how difficult have these problems made it for you to do your work, take care of things at home, or get along with other people?           STOP-Bang Questionnaire :    Over the past month…    Snoring:   Do you snore loudly (loud enough to be heard through closed doors or your bed  partner elbows you for snoring at night)?  Yes   Tired:   Do you often feel tired, fatigued or sleepy during the daytime (such as falling asleep during driving or talking to someone)?  No   Observed:   Has anyone observed you stop breathing or choking/gasping during your sleep?  No   Pressure:   Do you have or are you being treated for high blood pressure?  Yes   Body Mass Index:   Is the BMI > 35kg/m2 ?   BMI = 37.22 Yes   Age:   Older than 50?  Yes   Neck Size:   Is your shirt collar > 16 inches/40 cm or larger? (measured around [place]ine apple).   Neck Size = 18.5 Yes     Gender:   Male/Female?  Male   Total Score: 6   0-2 = ZAHRA Low Risk    3-4 = ZAHRA Intermediate Risk    5-8 = ZAHRA High Risk  Or >2 + male gender  Or >2 + BMI > 35kg/m2  Or > 2 + neck circumference 16 inches / 40cm        Imaging Reviewed:     MRI of the brain without contrast reveals a small punctate infarct in the left cerebellum.  No evidence of hemorrhage.  Possible occult right posterior pontine/midbrain stroke given patient's MANSI.    MRI of the head reveals left vertebral artery stenosis however no evidence of flow-limiting stenosis or large vessel stroke.    Results for orders placed during the hospital encounter of 02/03/23    Duplex Carotid Ultrasound CAR    Interpretation Summary  •  Right internal carotid artery stenosis of 0-49%.  •  Left internal carotid artery stenosis of 0-49%.    Results for orders placed during the hospital encounter of 02/23/23    Adult Transthoracic Echo Limited W/ Cont if Necessary Per Protocol    Interpretation Summary  •  Left ventricular ejection fraction appears to be 56 - 60%.  •  Peak velocity of the flow distal to the aortic valve is 248.7 cm/s. Aortic valve mean pressure gradient is 13 mmHg.  •  There is a TAVR valve present.  •  There is trace AI  Left atrial cavity moderately dilated, bubble study not performed    Holter monitor worn for 12 days, 23 hours, and 46 minutes with multiple episodes of paroxysmal  atrial fibrillation (longest episode 34 minutes)    Laboratory Results:     H/H12.6/37.8  Platelets 199  A1c 6.40  LDL 73  AST 33  ALT 52      Assessment / Plan      Assessment/Plan:   Diagnoses and all orders for this visit:    1. History of stroke, MRI occult right brainstem and left cerebellar (Primary), etiology paroxysmal atrial fibrillation  -Continue Xarelto 20 mg and ASA 81 mg daily  -Continue risk factor modification strategies  -911 for strokelike symptoms  -STOP BANG score 6 indicating high risk of obstructive sleep apnea; patient would like to focus on increased physical activity and weight loss prior to having polysomnography exam performed.    2. Essential hypertension  -Patient reports blood pressures have been in the 120s-130s/80-90s  -Continue antihypertensive medications per primary care and cardiology recommendations  -Goal blood pressure <140/80; advised patient to contact PCP or cardiology should his SBP remain consistently > 160 as this increases risk of intercerebral hemorrhage with chronic anticoagulation use.    3. Hyperlipidemia LDL goal <70  -LDL admission 73  -Continue atorvastatin 80 mg nightly  -Will need repeat lipid panel as well as repeat LFT to ensure ALT has normalized; patient to be fasting    4. Type 2 diabetes mellitus with hyperglycemia, without long-term current use of insulin  -A1c on admission 6.40, goal <7  -Patient states his blood glucose is currently controlled by diet  -Goal blood glucose <140    5. Paroxysmal A-fib  -Continue rivaroxaban (Xarelto) 20 MG tablet; Take 1 tablet by mouth Daily With Dinner.  Dispense: 90 tablet; Refill: 3  -Patient currently in normal sinus rhythm  -Keep scheduled appointment with Dr. Guan    6. Tobacco use  -Patient educated on the importance of smoking cessation to decrease risk of future cardiovascular and cerebrovascular events.  Also discussed how tobacco use contributes to progression of atherosclerotic disease and lead to  pulmonary complications.  Patient voices understanding and is willing to continue to cut back.  -He plans to purchase nicotine toothpicks to see if this will help him in quitting  -We did discuss the possibility of adding Wellbutrin to assist with smoking cessation and weight loss.  Patient would like to think about this and discuss with primary care physician before initiating.    7. Diplopia  -Patient continues to have diplopia, ambulatory Referral to Ophthalmology for formal visual field testing and possible corrective lenses.  -Patient does not feel that his diplopia interferes with his ability to operate a motor vehicle.    Discussed the importance of medication compliance (Xarelto 20 mg, ASA 81 mg, and atorvastatin 80 mg) and lifestyle modifications (adequate blood pressure control, adequate control of hyperlipidemia, adequate glycemic control, increased physical activity, smoking cessation and healthy diet) to help reduce the risk of future cerebrovascular events.  Also discussed the signs symptoms that would warrant the patient return back to the emergency department including unilateral weakness, unilateral numbness, visual disturbances, loss of balance, speech difficulties, and/or a sudden severe headache.  Patient voices understanding.  He has been encouraged to contact our office should he have any questions or concerns prior to his next follow-up appointment.      Follow Up:   Return in about 6 months (around 11/22/2023) for Recheck.      Time spent 30 minutes    KYLAH Wei  [facility]

## 2023-05-22 NOTE — LETTER
May 23, 2023     Joe Kaminski MD  [address] [place] [address] [place] [address]    Patient: John Wolfe   YOB: 1965   Date of Visit: 2023       Dear Dr. Gordy MD:    Today I saw Mr. John Wolfe in stroke clinic for 3-month follow-up s/p left cerebellar stroke and suspected MRI occult right posterior pontine/midbrain stroke. Below are the relevant portions of my assessment and plan of care.    If you have questions, please do not hesitate to call me. I look forward to following John along with you.         Sincerely,        [facility]        CC: Edwin Guan MD    Maxine Patel, APRN  [phone]  Signed    New Patient Office Visit      Encounter Date: 2023   Patient Name: John Wolfe  : 1965   MRN: [id number]   PCP: Joe Kaminski MD    Referring Provider: Andrei Guallpa MD     Chief Complaint:    Chief Complaint   Patient presents with   • Follow-up       History of Present Illness: John Wolfe is a 57 y.o. male with known medical diagnoses of essential hypertension, hyperlipidemia, diabetes mellitus type 2, coronary artery disease s/p TAVR (2023, Dr. Barger), ongoing tobacco abuse who presents to clinic today for 3-month follow-up s/p left cerebellar infarct.    Mr. Wolfe was admitted to our facility on  for TAVR procedure performed by Dr. Barger.  Postprocedure the patient developed an acute onset of diplopia prompting an MRI of the brain without contrast to be performed which revealed a left cerebellar infarct.  Stroke neurology was consulted for further evaluation and recommendations.  He was not a candidate for IV thrombolytic therapy secondary to recent cardiac surgery and was not a candidate for endovascular therapy as symptoms were inconsistent with large vessel occlusive stroke.  He was evaluated by Dr. Little who also felt that the patient had a MRI occult right brainstem infarct (posterior mare/midbrain)  given his opthomalmoplegia.  The patient was discharged home on DAPT therapy, high intensity statin, and a mobile cardiac monitor.  His cardiac monitor revealed a paroxysmal atrial fibrillation therefore he was started on Xarelto 20 mg daily on 3/17/2023.    Since discharge the patient denies any new strokelike symptoms.  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  He tells me he has not been evaluated by ophthalmology however has been operating a vehicle without difficulty.  He tells me that he has remained compliant on his Xarelto 20 mg, ASA 81 mg, and atorvastatin 80 mg with no adverse side effects.  He is independent of his ADLs and walks without an assistive device.  He denies any recent falls.  He does tell me that he has been slowly reincorporating exercise into his daily routine and is able to ride the stationary bike for 30 minutes/day.  He is hoping that increasing his physical activity also helps him with weight loss.  He does continue to smoke however tells me that he has significantly cut back.  We discussed the option of trying Wellbutrin which would aid and smoking cessation as well as weight loss.  He states he would like to think about this and talk with his primary care physician before starting.    Stroke Risk Factors: paroxysmal atrial fibrillation, diabetes mellitus, hyperlipidemia, hypertension and smoking      Subjective      Review of Systems:   Review of Systems   Constitutional: Positive for fatigue. Negative for activity change, chills and fever.   HENT: Negative.  Negative for nosebleeds and trouble swallowing.    Eyes: Positive for double vision and visual disturbance. Negative for blurred vision and photophobia.   Respiratory: Negative.  Negative for cough, chest tightness and shortness of breath.    Cardiovascular: Negative.  Negative for chest pain and palpitations.   Gastrointestinal: Negative.  Negative for  blood in stool, diarrhea, nausea and vomiting.   Genitourinary: Negative.  Negative for hematuria.   Musculoskeletal: Negative.  Negative for gait problem.   Skin: Negative.    Neurological: Negative for dizziness, facial asymmetry, speech difficulty, weakness, numbness and headache.   Hematological: Negative.    Psychiatric/Behavioral: Negative.        Past Medical History:   Past Medical History:   Diagnosis Date   • Aortic valve stenosis    • Diabetes mellitus    • GERD (gastroesophageal reflux disease)    • Heart murmur    • Hyperlipidemia    • Hypertension        Past Surgical History:   Past Surgical History:   Procedure Laterality Date   • AORTIC VALVE REPAIR/REPLACEMENT N/A 2/23/2023    Procedure: TRANSCATHETER AORTIC VALVE REPLACEMENT,TEODORO;  Surgeon: Clemente Barger MD;  Location: [facility];  Service: Cardiothoracic;  Laterality: N/A;  FLOURO  DOSE  CONTRAST   • AORTIC VALVE REPAIR/REPLACEMENT N/A 2/23/2023    Procedure: Transfemoral Transcatheter Aortic Valve Replacement;  Surgeon: Edwin Guan MD;  Location:  [facility];  Service: Cardiovascular;  Laterality: N/A;   • CARDIAC CATHETERIZATION N/A 01/26/2023    Procedure: Left Heart Cath;  Surgeon: Edwin Guan MD;  Location: [facility] CATH INVASIVE LOCATION;  Service: Cardiology;  Laterality: N/A;   • CHOLECYSTECTOMY     • NASAL SEPTAL RECONSTRUCTION         Family History:   Family History   Problem Relation Age of Onset   • Hypertension Mother    • Hyperlipidemia Mother    • Esophageal cancer Mother    • Stroke Father    • Hypertension Father    • Hyperlipidemia Father        Social History:   Social History     Socioeconomic History   • Marital status: Significant Other   • Number of children: 1   Tobacco Use   • Smoking status: Every Day     Packs/day: 1.00     Years: 35.00     Pack years: 35.00     Types: Cigarettes     Passive exposure: Current   • Smokeless tobacco: Never   Vaping Use   • Vaping Use: Former   •  "Substances: Nicotine, Flavoring   • Devices: RefVinnyble tank   Substance and Sexual Activity   • Alcohol use: Yes     Comment: rarely   • Drug use: Never   • Sexual activity: Defer       Medications:     Current Outpatient Medications:   •  amLODIPine (NORVASC) 5 MG tablet, Take 1 tablet by mouth Daily., Disp: , Rfl:   •  aspirin 81 MG chewable tablet, Chew 1 tablet Daily., Disp: 30 tablet, Rfl: 0  •  atorvastatin (LIPITOR) 80 MG tablet, Take 1 tablet by mouth Every Night., Disp: 90 tablet, Rfl: 3  •  bisoprolol (ZEBeta) 5 MG tablet, Take 2.5 mg by mouth Every Night., Disp: , Rfl:   •  esomeprazole (nexIUM) 40 MG capsule, Take 1 capsule by mouth Every Morning Before Breakfast., Disp: , Rfl:   •  furosemide (LASIX) 20 MG tablet, Take 1 tablet by mouth Daily., Disp: , Rfl:   •  loratadine (CLARITIN) 10 MG tablet, Take 1 tablet by mouth Daily., Disp: , Rfl:   •  potassium chloride 10 MEQ CR tablet, TAKE 1 TO 2 BY MOUTH ONCE DAILY AS DIRECTED, Disp: , Rfl:   •  rivaroxaban (Xarelto) 20 MG tablet, Take 1 tablet by mouth Daily With Dinner., Disp: 90 tablet, Rfl: 3  •  valsartan (DIOVAN) 160 MG tablet, Take 1 tablet by mouth Daily., Disp: , Rfl:   No current facility-administered medications for this visit.    Facility-Administered Medications Ordered in Other Visits:   •  Chlorhexidine Gluconate Cloth 2 % pads 1 application, 1 application, Topical, Q12H PRN, Will, Maura Vogel, [facility]    Allergies:   Allergies   Allergen Reactions   • Lidocaine Hcl-Sodium Chloride Hives   • Ceclor [Cefaclor] Rash       Objective     Physical Exam:  Vital Signs:   Vitals:    05/22/23 1038   BP: 120/80   Pulse: 73   Temp: 98.1 °F (36.7 °C)   SpO2: 95%   Weight: 132 kg (290 lb)   Height: 188 cm (74.02\")     Body mass index is 37.22 kg/m².     Physical Exam  Vitals and nursing note reviewed.   Constitutional:       General: He is not in acute distress.     Appearance: He is obese. He is not ill-appearing.   HENT:      Head: Normocephalic and " atraumatic.      Mouth/Throat:      Mouth: Mucous membranes are moist.   Eyes:      Pupils: Pupils are equal, round, and reactive to light.      Comments: Disconjugate gaze, 3rd nerve palsy and right eye   Cardiovascular:      Rate and Rhythm: Normal rate and regular rhythm.      Heart sounds: Normal heart sounds.   Pulmonary:      Effort: Pulmonary effort is normal. No respiratory distress.      Breath sounds: Normal breath sounds.      Comments: On room air  Abdominal:      General: Bowel sounds are normal.   Skin:     General: Skin is warm and dry.   Neurological:      Mental Status: He is alert and oriented to person, place, and time.      Cranial Nerves: Cranial nerve deficit present.      Sensory: No sensory deficit.      Motor: Motor strength is normal. No weakness.      Coordination: Coordination normal.   Psychiatric:         Mood and Affect: Mood normal.         Speech: Speech normal.         Behavior: Behavior normal.       Neurological Exam  Mental Status  Alert. Oriented to person, place, time and situation. Oriented to person, place, and time. Speech is normal. Language is fluent with no aphasia. Attention and concentration are normal. Fund of knowledge is appropriate for level of education.    Cranial Nerves  CN II: Vision test: Disconjugate gaze, 3rd nerve palsy and right eye. Visual fields full to confrontation.  CN III, IV, VI: Pupils equal round and reactive to light bilaterally. Disconjugate gaze and right eye, third or palsy.  CN V: Facial sensation is normal.  CN VII: Full and symmetric facial movement.  CN VIII: Hearing intact bilaterally.  CN IX, X: Palate elevates symmetrically    Motor  Normal muscle bulk throughout. No fasciculations present. Normal muscle tone. Strength is 5/5 throughout all four extremities.    Sensory  Sensation is intact to light touch, pinprick, vibration and proprioception in all four extremities.    Coordination  Right: Finger-to-nose normal. Heel-to-shin  normal.Left: Finger-to-nose normal. Heel-to-shin normal.    Gait  Casual gait is normal including stance, stride, and arm swing.       NIH Stroke Scale    1a  Level of consciousness: 0=alert; keenly responsive   1b. LOC questions:  0=Answers both questions correctly    1c. LOC commands: 0=Performs both tasks correctly   2.  Best Gaze: 0=normal   3. Visual: 0=No visual loss   4. Facial Palsy: 0=Normal symmetric movement   5a. Motor left arm: 0=No drift, limb holds 90 (or 45) degrees for full 10 seconds   5b.  Motor right arm: 0=No drift, limb holds 90 (or 45) degrees for full 10 seconds   6a. Motor left le=No drift, limb holds 90 (or 45) degrees for full 10 seconds   6b  Motor right le=No drift, limb holds 90 (or 45) degrees for full 10 seconds   7. Limb Ataxia: 0=Absent   8.  Sensory: 0=Normal; no sensory loss   9. Best Language:  0=No aphasia, normal   10. Dysarthria: 0=Normal   11. Extinction and Inattention: 0=No abnormality    Total:   0         Modified Matheson Score: 1; diplopia        0  No Symptoms    1 No significant disability. Able to carry out all usual activities, despite some symptoms.    2 Slight disability. Able to look after own affairs without assistance, but unable to carry out all previous activities.    3 Moderate disability. Requires some help, but able to walk unassisted.    4 Moderately severe disability. Unable to attend to own bodily needs without assistance, and unable to walk unassisted.    5 Severe disability. Requires constant nursing care and attention, bedridden, incontinent.    6 Dead        PHQ-9 Depression Screening  Little interest or pleasure in doing things? 0-->not at all   Feeling down, depressed, or hopeless? 0-->not at all   Trouble falling or staying asleep, or sleeping too much?     Feeling tired or having little energy?     Poor appetite or overeating?     Feeling bad about yourself - or that you are a failure or have let yourself or your family down?     Trouble  concentrating on things, such as reading the newspaper or watching television?     Moving or speaking so slowly that other people could have noticed? Or the opposite - being so fidgety or restless that you have been moving around a lot more than usual?     Thoughts that you would be better off dead, or of hurting yourself in some way?     PHQ-9 Total Score 0   If you checked off any problems, how difficult have these problems made it for you to do your work, take care of things at home, or get along with other people?           STOP-Bang Questionnaire :    Over the past month…    Snoring:   Do you snore loudly (loud enough to be heard through closed doors or your bed partner elbows you for snoring at night)?  Yes   Tired:   Do you often feel tired, fatigued or sleepy during the daytime (such as falling asleep during driving or talking to someone)?  No   Observed:   Has anyone observed you stop breathing or choking/gasping during your sleep?  No   Pressure:   Do you have or are you being treated for high blood pressure?  Yes   Body Mass Index:   Is the BMI > 35kg/m2 ?   BMI = 37.22 Yes   Age:   Older than 50?  Yes   Neck Size:   Is your shirt collar > 16 inches/40 cm or larger? (measured around Shine apple).   Neck Size = 18.5 Yes     Gender:   Male/Female?  Male   Total Score: 6   0-2 = ZAHRA Low Risk    3-4 = ZAHRA Intermediate Risk    5-8 = ZAHRA High Risk  Or >2 + male gender  Or >2 + BMI > 35kg/m2  Or > 2 + neck circumference 16 inches / 40cm        Imaging Reviewed:     MRI of the brain without contrast reveals a small punctate infarct in the left cerebellum.  No evidence of hemorrhage.  Possible occult right posterior pontine/midbrain stroke given patient's MANSI.    MRI of the head reveals left vertebral artery stenosis however no evidence of flow-limiting stenosis or large vessel stroke.    Results for orders placed during the hospital encounter of 02/03/23    Duplex Carotid Ultrasound CAR    Interpretation  Summary  •  Right internal carotid artery stenosis of 0-49%.  •  Left internal carotid artery stenosis of 0-49%.    Results for orders placed during the hospital encounter of 02/23/23    Adult Transthoracic Echo Limited W/ Cont if Necessary Per Protocol    Interpretation Summary  •  Left ventricular ejection fraction appears to be 56 - 60%.  •  Peak velocity of the flow distal to the aortic valve is 248.7 cm/s. Aortic valve mean pressure gradient is 13 mmHg.  •  There is a TAVR valve present.  •  There is trace AI  Left atrial cavity moderately dilated, bubble study not performed    Holter monitor worn for 12 days, 23 hours, and 46 minutes with multiple episodes of paroxysmal atrial fibrillation (longest episode 34 minutes)    Laboratory Results:     H/H12.6/37.8  Platelets 199  A1c 6.40  LDL 73  AST 33  ALT 52      Assessment / Plan      Assessment/Plan:   Diagnoses and all orders for this visit:    1. History of stroke, MRI occult right brainstem and left cerebellar (Primary), etiology paroxysmal atrial fibrillation  -Continue Xarelto 20 mg and ASA 81 mg daily  -Continue risk factor modification strategies  -911 for strokelike symptoms  -STOP BANG score 6 indicating high risk of obstructive sleep apnea; patient would like to focus on increased physical activity and weight loss prior to having polysomnography exam performed.    2. Essential hypertension  -Patient reports blood pressures have been in the 120s-130s/80-90s  -Continue antihypertensive medications per primary care and cardiology recommendations  -Goal blood pressure <140/80; advised patient to contact PCP or cardiology should his SBP remain consistently > 160 as this increases risk of intercerebral hemorrhage with chronic anticoagulation use.    3. Hyperlipidemia LDL goal <70  -LDL admission 73  -Continue atorvastatin 80 mg nightly  -Will need repeat lipid panel as well as repeat LFT to ensure ALT has normalized; patient to be fasting    4. Type 2  diabetes mellitus with hyperglycemia, without long-term current use of insulin  -A1c on admission 6.40, goal <7  -Patient states his blood glucose is currently controlled by diet  -Goal blood glucose <140    5. Paroxysmal A-fib  -Continue rivaroxaban (Xarelto) 20 MG tablet; Take 1 tablet by mouth Daily With Dinner.  Dispense: 90 tablet; Refill: 3  -Patient currently in normal sinus rhythm  -Keep scheduled appointment with Dr. Guan    6. Tobacco use  -Patient educated on the importance of smoking cessation to decrease risk of future cardiovascular and cerebrovascular events.  Also discussed how tobacco use contributes to progression of atherosclerotic disease and lead to pulmonary complications.  Patient voices understanding and is willing to continue to cut back.  -He plans to purchase nicotine toothpicks to see if this will help him in quitting  -We did discuss the possibility of adding Wellbutrin to assist with smoking cessation and weight loss.  Patient would like to think about this and discuss with primary care physician before initiating.    7. Diplopia  -Patient continues to have diplopia, ambulatory Referral to Ophthalmology for formal visual field testing and possible corrective lenses.  -Patient does not feel that his diplopia interferes with his ability to operate a motor vehicle.    Discussed the importance of medication compliance (Xarelto 20 mg, ASA 81 mg, and atorvastatin 80 mg) and lifestyle modifications (adequate blood pressure control, adequate control of hyperlipidemia, adequate glycemic control, increased physical activity, smoking cessation and healthy diet) to help reduce the risk of future cerebrovascular events.  Also discussed the signs symptoms that would warrant the patient return back to the emergency department including unilateral weakness, unilateral numbness, visual disturbances, loss of balance, speech difficulties, and/or a sudden severe headache.  Patient voices understanding.   He has been encouraged to contact our office should he have any questions or concerns prior to his next follow-up appointment.      Follow Up:   Return in about 6 months (around 11/22/2023) for Recheck.      Time spent 30 minutes    KYLAH Wei  [facility] [place] [facility] Stroke

## 2023-05-22 NOTE — PATIENT INSTRUCTIONS
-Continue Aspirin 81mg and Xarelto 20mg daily  -Continue Lipitor 80mg nightly  -Blood pressure management, goal <140/80  -Goal blood glucose <140  -Increase physical activity as tolerated; 30min/day 3-5 times weekly  -Cut back on smoking   -Consider test for sleep apnea  -911 for stroke symptoms (unilateral weakness, numbness, visual changes (different than baseline), trouble speaking, dizziness, or sudden severe headache)  -referral to opthalmology for visual mapping and diplopia

## 2023-11-17 ENCOUNTER — LAB (OUTPATIENT)
Dept: LAB | Facility: HOSPITAL | Age: 58
End: 2023-11-17
Payer: COMMERCIAL

## 2023-11-17 ENCOUNTER — OFFICE VISIT (OUTPATIENT)
Dept: NEUROLOGY | Facility: CLINIC | Age: 58
End: 2023-11-17
Payer: COMMERCIAL

## 2023-11-17 VITALS
OXYGEN SATURATION: 95 % | HEIGHT: 74 IN | SYSTOLIC BLOOD PRESSURE: 132 MMHG | WEIGHT: 300 LBS | DIASTOLIC BLOOD PRESSURE: 78 MMHG | TEMPERATURE: 98.4 F | BODY MASS INDEX: 38.5 KG/M2 | HEART RATE: 73 BPM

## 2023-11-17 DIAGNOSIS — E78.5 HYPERLIPIDEMIA LDL GOAL <70: ICD-10-CM

## 2023-11-17 DIAGNOSIS — E11.65 TYPE 2 DIABETES MELLITUS WITH HYPERGLYCEMIA, WITHOUT LONG-TERM CURRENT USE OF INSULIN: Chronic | ICD-10-CM

## 2023-11-17 DIAGNOSIS — I10 ESSENTIAL HYPERTENSION: Chronic | ICD-10-CM

## 2023-11-17 DIAGNOSIS — Z86.73 HISTORY OF STROKE: ICD-10-CM

## 2023-11-17 DIAGNOSIS — I48.0 PAROXYSMAL A-FIB: ICD-10-CM

## 2023-11-17 DIAGNOSIS — Z86.73 HISTORY OF STROKE: Primary | ICD-10-CM

## 2023-11-17 LAB
ALBUMIN SERPL-MCNC: 4.9 G/DL (ref 3.5–5.2)
ALBUMIN/GLOB SERPL: 2 G/DL
ALP SERPL-CCNC: 70 U/L (ref 39–117)
ALT SERPL W P-5'-P-CCNC: 70 U/L (ref 1–41)
ANION GAP SERPL CALCULATED.3IONS-SCNC: 9.5 MMOL/L (ref 5–15)
AST SERPL-CCNC: 47 U/L (ref 1–40)
BILIRUB SERPL-MCNC: 0.2 MG/DL (ref 0–1.2)
BUN SERPL-MCNC: 12 MG/DL (ref 6–20)
BUN/CREAT SERPL: 11.9 (ref 7–25)
CALCIUM SPEC-SCNC: 9.5 MG/DL (ref 8.6–10.5)
CHLORIDE SERPL-SCNC: 101 MMOL/L (ref 98–107)
CHOLEST SERPL-MCNC: 230 MG/DL (ref 0–200)
CO2 SERPL-SCNC: 29.5 MMOL/L (ref 22–29)
CREAT SERPL-MCNC: 1.01 MG/DL (ref 0.76–1.27)
EGFRCR SERPLBLD CKD-EPI 2021: 85.7 ML/MIN/1.73
GLOBULIN UR ELPH-MCNC: 2.5 GM/DL
GLUCOSE SERPL-MCNC: 104 MG/DL (ref 65–99)
HDLC SERPL-MCNC: 24 MG/DL (ref 40–60)
LDLC SERPL CALC-MCNC: 160 MG/DL (ref 0–100)
LDLC/HDLC SERPL: 6.53 {RATIO}
POTASSIUM SERPL-SCNC: 3.8 MMOL/L (ref 3.5–5.2)
PROT SERPL-MCNC: 7.4 G/DL (ref 6–8.5)
SODIUM SERPL-SCNC: 140 MMOL/L (ref 136–145)
TRIGL SERPL-MCNC: 246 MG/DL (ref 0–150)
VLDLC SERPL-MCNC: 46 MG/DL (ref 5–40)

## 2023-11-17 PROCEDURE — 80053 COMPREHEN METABOLIC PANEL: CPT

## 2023-11-17 PROCEDURE — 80061 LIPID PANEL: CPT

## 2023-11-17 PROCEDURE — 36415 COLL VENOUS BLD VENIPUNCTURE: CPT

## 2023-11-17 RX ORDER — CETIRIZINE HYDROCHLORIDE 10 MG/1
10 TABLET ORAL DAILY
COMMUNITY

## 2023-11-17 RX ORDER — VARENICLINE TARTRATE 0.5 MG/1
0.5 TABLET, FILM COATED ORAL 2 TIMES DAILY
Qty: 60 TABLET | Refills: 3 | Status: SHIPPED | OUTPATIENT
Start: 2023-11-17

## 2023-11-17 RX ORDER — ALBUTEROL SULFATE 90 UG/1
AEROSOL, METERED RESPIRATORY (INHALATION)
COMMUNITY
Start: 2023-09-19

## 2023-11-17 NOTE — PROGRESS NOTES
Follow Up Office Visit      Encounter Date: 2023   Patient Name: John Wolfe  : 1965   MRN: [id number]   PCP: Joe Kaminski MD    Chief Complaint:    Chief Complaint   Patient presents with   • Follow-up   • Stroke       History of Present Illness: John Wolfe is a 57 y.o. male with known medical diagnoses of essential hypertension, hyperlipidemia, diabetes mellitus type 2, coronary artery disease s/p TAVR (2023, Dr. Barger), ongoing tobacco abuse who presents to clinic today for 3-month follow-up s/p left cerebellar infarct.     Mr. Wolfe was admitted to our facility on  for TAVR procedure performed by Dr. Barger.  Postprocedure the patient developed an acute onset of diplopia prompting an MRI of the brain without contrast to be performed which revealed a left cerebellar infarct.  Stroke neurology was consulted for further evaluation and recommendations.  He was not a candidate for IV thrombolytic therapy secondary to recent cardiac surgery and was not a candidate for endovascular therapy as symptoms were inconsistent with large vessel occlusive stroke.  He was evaluated by Dr. Little who also felt that the patient had a MRI occult right brainstem infarct (posterior mare/midbrain) given his opthomalmoplegia.  The patient was discharged home on DAPT therapy, high intensity statin, and a mobile cardiac monitor.  His cardiac monitor revealed a paroxysmal atrial fibrillation therefore he was started on Xarelto 20 mg daily on 3/17/2023.     Since discharge the patient denies any new strokelike symptoms.  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  He tells me he has not been evaluated by ophthalmology however has been operating a vehicle without difficulty.  He tells me that he has remained compliant on his Xarelto 20 mg, ASA 81 mg, and atorvastatin 80 mg with no adverse side effects.  He is independent  of his ADLs and walks without an assistive device.  He denies any recent falls.  He does tell me that he has been slowly reincorporating exercise into his daily routine and is able to ride the stationary bike for 30 minutes/day.  He is hoping that increasing his physical activity also helps him with weight loss.  He does continue to smoke however tells me that he has significantly cut back.  We discussed the option of trying Wellbutrin which would aid and smoking cessation as well as weight loss.  He states he would like to think about this and talk with his primary care physician before starting.     Clinic Visit 11/17/2023: Patient presents today for follow-up.  He denies any new strokelike symptoms.  Continues to have diplopia when looking to his left; cranial nerve III palsy in the right eye.  Has not seen ophthalmology.  He reports compliance with all medications including Xarelto and aspirin; denies any signs and symptoms of bleeding.  Reports his blood pressures typically in the 130-140 range; he ran out of his amlodipine about 1 week ago.  Has been trying to exercise and eat healthier.  Continues to smoke 1 pack daily, he is interested in cutting back.  Discussed that his stop bang score is elevated and dictating risk of obstructive sleep apnea; he is not interested in a sleep evaluation at this time.    Subjective        I have reviewed and the following portions of the patient's history were updated as appropriate: past family history, past medical history, past social history, past surgical history and problem list.    Medications:     Current Outpatient Medications:   •  amLODIPine (NORVASC) 5 MG tablet, Take 1 tablet by mouth Daily., Disp: , Rfl:   •  aspirin 81 MG chewable tablet, Chew 1 tablet Daily., Disp: 30 tablet, Rfl: 0  •  atorvastatin (LIPITOR) 80 MG tablet, Take 1 tablet by mouth Every Night., Disp: 90 tablet, Rfl: 3  •  bisoprolol (ZEBeta) 5 MG tablet, Take 2.5 mg by mouth Every Night., Disp:  , Rfl:   •  esomeprazole (nexIUM) 40 MG capsule, Take 1 capsule by mouth Every Morning Before Breakfast., Disp: , Rfl:   •  furosemide (LASIX) 20 MG tablet, Take 1 tablet by mouth Daily., Disp: , Rfl:   •  loratadine (CLARITIN) 10 MG tablet, Take 1 tablet by mouth Daily., Disp: , Rfl:   •  potassium chloride 10 MEQ CR tablet, TAKE 1 TO 2 BY MOUTH ONCE DAILY AS DIRECTED, Disp: , Rfl:   •  rivaroxaban (Xarelto) 20 MG tablet, Take 1 tablet by mouth Daily With Dinner., Disp: 90 tablet, Rfl: 3  •  valsartan (DIOVAN) 160 MG tablet, Take 1 tablet by mouth Daily., Disp: , Rfl:   No current facility-administered medications for this visit.    Facility-Administered Medications Ordered in Other Visits:   •  Chlorhexidine Gluconate Cloth 2 % pads 1 application, 1 application , Topical, Q12H PRN, Will, Maura Lela, APRN    Allergies:   Allergies   Allergen Reactions   • Lidocaine Hcl-Sodium Chloride Hives   • Ceclor [Cefaclor] Rash       Objective     Physical Exam:   Vital Signs: There were no vitals filed for this visit.  There is no height or weight on file to calculate BMI.    Physical Exam  Vitals reviewed.   Constitutional:       Appearance: Normal appearance.   HENT:      Head: Normocephalic and atraumatic.   Eyes:      General: Lids are normal.      Extraocular Movements: EOM normal     Pupils: Pupils are equal, round, and reactive to light.   Cardiovascular:      Rate and Rhythm: Normal rate.   Pulmonary:      Effort: Pulmonary effort is normal. No respiratory distress.   Musculoskeletal:         General: Normal range of motion.      Cervical back: Normal range of motion and neck supple.      Right lower leg: Edema present.      Left lower leg: Edema present.   Skin:     General: Skin is warm and dry.   Neurological:      General: No focal deficit present.      Mental Status: He is alert and oriented to person, place, and time.      Cranial Nerves: Cranial nerve deficit present.      Sensory: No sensory deficit.       "Motor: No weakness.      Coordination: Coordination is intact.   Psychiatric:         Mood and Affect: Mood normal.         Speech: Speech normal.         Behavior: Behavior normal.       Neurological Exam  Mental Status  Alert. Oriented to person, place, and time. Speech is normal. Language is fluent with no aphasia. Attention and concentration are normal.    Cranial Nerves  CN II: Right visual acuity: Counts fingers. Left visual acuity: Counts fingers.  CN III, IV, VI: Abnormal extraocular movements: Cranial nerve III palsy. Normal lids and orbits bilaterally. Pupils equal round and reactive to light bilaterally.  CN V: Facial sensation is normal.  CN VII: Full and symmetric facial movement.  CN XI: Shoulder shrug strength is normal.  CN XII: Tongue midline without atrophy or fasciculations.    Motor  Normal muscle bulk throughout. No fasciculations present. Normal muscle tone. No abnormal involuntary movements. Strength is 5/5 in all four extremities except as noted.    Sensory  Light touch is normal in upper and lower extremities.     Coordination    Finger-to-nose, rapid alternating movements and heel-to-shin normal bilaterally without dysmetria.    Gait  Casual gait is normal including stance, stride, and arm swing.       Procedures    PHQ-2 Depression Screening  Little interest or pleasure in doing things? 0-->not at all   Feeling down, depressed, or hopeless? 0-->not at all   PHQ-2 Total Score 0     STOP-Bang Questionnaire :    STOP-Bang Score  Have you been diagnosed with Sleep Apnea?: no  Snoring?: yes  Tired?: no  Observed?: no  Pressure?: yes  Stop Score: 2  Body Mass Index more than 35 kg/m2?: yes  Age older than 50 year old?: yes  Neck large? \">17\"/43cm-M, >16\"/41cm-F: yes  Gender=Male?: yes  Total Stop-Bang Score: 6       [facility] Fall Risk Clinician Key Questions   Have you fallen in the past year?: No  Do you feel unsteady with walking?: No  Are you worried about falling?: No       Results Reviewed: "     No new labs or imaging available for my review    Assessment / Plan      Assessment/Plan:   Diagnoses and all orders for this visit:      1. History of stroke, MRI occult right brainstem and left cerebellar (Primary), etiology paroxysmal atrial fibrillation  -Continue Xarelto 20 mg and ASA 81 mg daily  -Discussed S/S of bleeding to monitor for  -Continue risk factor modification strategies  -911 for strokelike symptoms  -STOP BANG score 6 indicating high risk of obstructive sleep apnea; patient would like to focus on increased physical activity and weight loss; not interested in a sleep study at this time     2. Essential hypertension  -Continue antihypertensive medications per primary care and cardiology recommendations  -Goal blood pressure <140/80; advised patient to contact PCP or cardiology should his SBP remain consistently > 160 as this increases risk of intercerebral hemorrhage with chronic anticoagulation use.     3. Hyperlipidemia LDL goal <70  -LDL admission 73  -Continue atorvastatin 80 mg nightly  -Reordered lipid panel and cmp to be performed today     4. Type 2 diabetes mellitus with hyperglycemia, without long-term current use of insulin  -A1c on admission 6.40, goal <7  -Patient states his blood glucose is currently controlled by diet  -Goal blood glucose <140  -PAtient expressed interest in semaglutide, advised him to contact his pcp     5. Paroxysmal A-fib  -Continue rivaroxaban (Xarelto) 20 MG tablet; Take 1 tablet by mouth Daily With Dinner.  Dispense: 90 tablet; Refill: 3  -Keep scheduled appointment with Dr. Guan     6. Tobacco use  -Patient educated on the importance of smoking cessation to decrease risk of future cardiovascular and cerebrovascular events.  Also discussed how tobacco use contributes to progression of atherosclerotic disease and lead to pulmonary complications.  Patient voices understanding and is willing to continue to cut back.  -Patient would like to try chantix,  order has been placed    7. Diplopia  -Patient continues to have diplopia, ambulatory Referral to Ophthalmology for formal visual field testing and possible corrective lenses.  -Patient does not feel that his diplopia interferes with his ability to operate a motor vehicle.     Discussed the importance of medication compliance (Xarelto 20 mg, ASA 81 mg, and atorvastatin 80 mg) and lifestyle modifications (adequate blood pressure control, adequate control of hyperlipidemia, adequate glycemic control, increased physical activity, smoking cessation and healthy diet) to help reduce the risk of future cerebrovascular events.  Also discussed the signs symptoms that would warrant the patient return back to the emergency department including unilateral weakness, unilateral numbness, visual disturbances, loss of balance, speech difficulties, and/or a sudden severe headache.  Patient voices understanding.  He has been encouraged to contact our office should he have any questions or concerns prior to his next follow-up appointment.      Follow Up:   Return in about 3 months (around 2/17/2024).    KYLAH Hernández  [facility] [place] [facility]

## 2023-11-21 RX ORDER — EZETIMIBE 10 MG/1
10 TABLET ORAL DAILY
Qty: 30 TABLET | Refills: 11 | Status: SHIPPED | OUTPATIENT
Start: 2023-11-21 | End: 2024-11-20

## 2024-02-23 ENCOUNTER — OFFICE VISIT (OUTPATIENT)
Dept: NEUROLOGY | Facility: CLINIC | Age: 59
End: 2024-02-23
Payer: COMMERCIAL

## 2024-02-23 ENCOUNTER — LAB (OUTPATIENT)
Dept: LAB | Facility: HOSPITAL | Age: 59
End: 2024-02-23
Payer: COMMERCIAL

## 2024-02-23 VITALS
OXYGEN SATURATION: 94 % | HEIGHT: 74 IN | HEART RATE: 84 BPM | SYSTOLIC BLOOD PRESSURE: 134 MMHG | TEMPERATURE: 98.6 F | WEIGHT: 309 LBS | DIASTOLIC BLOOD PRESSURE: 86 MMHG | BODY MASS INDEX: 39.66 KG/M2

## 2024-02-23 DIAGNOSIS — E78.5 HYPERLIPIDEMIA, UNSPECIFIED HYPERLIPIDEMIA TYPE: Primary | ICD-10-CM

## 2024-02-23 DIAGNOSIS — Z86.73 HISTORY OF STROKE: ICD-10-CM

## 2024-02-23 DIAGNOSIS — R74.01 ELEVATED ALANINE AMINOTRANSFERASE (ALT) LEVEL: ICD-10-CM

## 2024-02-23 DIAGNOSIS — E78.5 HYPERLIPIDEMIA LDL GOAL <70: ICD-10-CM

## 2024-02-23 LAB
ALBUMIN SERPL-MCNC: 4.5 G/DL (ref 3.5–5.2)
ALBUMIN/GLOB SERPL: 1.6 G/DL
ALP SERPL-CCNC: 77 U/L (ref 39–117)
ALT SERPL W P-5'-P-CCNC: 83 U/L (ref 1–41)
ANION GAP SERPL CALCULATED.3IONS-SCNC: 12 MMOL/L (ref 5–15)
AST SERPL-CCNC: 63 U/L (ref 1–40)
BILIRUB CONJ SERPL-MCNC: 0.2 MG/DL (ref 0–0.3)
BILIRUB SERPL-MCNC: 0.5 MG/DL (ref 0–1.2)
BUN SERPL-MCNC: 15 MG/DL (ref 6–20)
BUN/CREAT SERPL: 14.9 (ref 7–25)
CALCIUM SPEC-SCNC: 9.6 MG/DL (ref 8.6–10.5)
CHLORIDE SERPL-SCNC: 101 MMOL/L (ref 98–107)
CHOLEST SERPL-MCNC: 232 MG/DL (ref 0–200)
CO2 SERPL-SCNC: 27 MMOL/L (ref 22–29)
CREAT SERPL-MCNC: 1.01 MG/DL (ref 0.76–1.27)
EGFRCR SERPLBLD CKD-EPI 2021: 85.7 ML/MIN/1.73
GLOBULIN UR ELPH-MCNC: 2.9 GM/DL
GLUCOSE SERPL-MCNC: 106 MG/DL (ref 65–99)
HDLC SERPL-MCNC: 26 MG/DL (ref 40–60)
LDLC SERPL CALC-MCNC: 168 MG/DL (ref 0–100)
LDLC/HDLC SERPL: 6.38 {RATIO}
POTASSIUM SERPL-SCNC: 3.5 MMOL/L (ref 3.5–5.2)
PROT SERPL-MCNC: 7.4 G/DL (ref 6–8.5)
SODIUM SERPL-SCNC: 140 MMOL/L (ref 136–145)
TRIGL SERPL-MCNC: 200 MG/DL (ref 0–150)
VLDLC SERPL-MCNC: 38 MG/DL (ref 5–40)

## 2024-02-23 PROCEDURE — 36415 COLL VENOUS BLD VENIPUNCTURE: CPT

## 2024-02-23 PROCEDURE — 80053 COMPREHEN METABOLIC PANEL: CPT

## 2024-02-23 PROCEDURE — 82248 BILIRUBIN DIRECT: CPT

## 2024-02-23 PROCEDURE — 80061 LIPID PANEL: CPT

## 2024-02-23 PROCEDURE — 99214 OFFICE O/P EST MOD 30 MIN: CPT | Performed by: NURSE PRACTITIONER

## 2024-02-23 RX ORDER — FUROSEMIDE 40 MG/1
1 TABLET ORAL DAILY
COMMUNITY
Start: 2024-02-06

## 2024-02-23 RX ORDER — ATORVASTATIN CALCIUM 80 MG/1
80 TABLET, FILM COATED ORAL NIGHTLY
Qty: 30 TABLET | Refills: 11 | Status: SHIPPED | OUTPATIENT
Start: 2024-02-23 | End: 2024-02-23

## 2024-02-23 NOTE — PROGRESS NOTES
Follow Up Office Visit      Encounter Date: 2024   Patient Name: John Wolfe  : 1965   MRN: [id number]   PCP: Joe Kaminski MD    Chief Complaint:    Chief Complaint   Patient presents with    Follow-up    Stroke       History of Present Illness: John Wolfe is a 57 y.o. male with known medical diagnoses of essential hypertension, hyperlipidemia, diabetes mellitus type 2, coronary artery disease s/p TAVR (2023, Dr. Barger), ongoing tobacco abuse who presents to clinic today for  follow-up s/p left cerebellar infarct.     Mr. Wolfe was admitted to our facility on  for TAVR procedure performed by Dr. Barger.  Postprocedure the patient developed an acute onset of diplopia prompting an MRI of the brain without contrast to be performed which revealed a left cerebellar infarct.  Stroke neurology was consulted for further evaluation and recommendations.  He was not a candidate for IV thrombolytic therapy secondary to recent cardiac surgery and was not a candidate for endovascular therapy as symptoms were inconsistent with large vessel occlusive stroke.  He was evaluated by Dr. Little who also felt that the patient had a MRI occult right brainstem infarct (posterior mare/midbrain) given his opthomalmoplegia.  The patient was discharged home on DAPT therapy, high intensity statin, and a mobile cardiac monitor.  His cardiac monitor revealed a paroxysmal atrial fibrillation therefore he was started on Xarelto 20 mg daily on 3/17/2023.     Since discharge the patient denies any new strokelike symptoms.  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  He tells me he has not been evaluated by ophthalmology however has been operating a vehicle without difficulty.  He tells me that he has remained compliant on his Xarelto 20 mg, ASA 81 mg, and atorvastatin 80 mg with no adverse side effects.  He is independent of his  ADLs and walks without an assistive device.  He denies any recent falls.  He does tell me that he has been slowly reincorporating exercise into his daily routine and is able to ride the stationary bike for 30 minutes/day.  He is hoping that increasing his physical activity also helps him with weight loss.  He does continue to smoke however tells me that he has significantly cut back.  We discussed the option of trying Wellbutrin which would aid and smoking cessation as well as weight loss.  He states he would like to think about this and talk with his primary care physician before starting.     Clinic Visit 11/17/2023: Patient presents today for follow-up.  He denies any new strokelike symptoms.  Continues to have diplopia when looking to his left; cranial nerve III palsy in the right eye.  Has not seen ophthalmology.  He reports compliance with all medications including Xarelto and aspirin; denies any signs and symptoms of bleeding.  Reports his blood pressures typically in the 130-140 range; he ran out of his amlodipine about 1 week ago.  Has been trying to exercise and eat healthier.  Continues to smoke 1 pack daily, he is interested in cutting back.  Discussed that his stop bang score is elevated and dictating risk of obstructive sleep apnea; he is not interested in a sleep evaluation at this time.    Clinic visit 2/23/2024: Patient is doing well. He reports no new or worsening stroke like symptoms. He has been taking his medications with no issues or side effects. He has not been on a statin due to having elevated AST/ALT last visit. I will recheck that today. His lipid panel is quite elevated so it would be ideal if possible to restart a statin. He is currently on zetia.     Subjective        I have reviewed and the following portions of the patient's history were updated as appropriate: past family history, past medical history, past social history, past surgical history and problem list.    Medications:      Current Outpatient Medications:     aspirin 81 MG chewable tablet, Chew 1 tablet Daily., Disp: 30 tablet, Rfl: 0    bisoprolol (ZEBeta) 5 MG tablet, Take 0.5 tablets by mouth Every Night., Disp: , Rfl:     cetirizine (zyrTEC) 10 MG tablet, Take 1 tablet by mouth Daily., Disp: , Rfl:     ezetimibe (Zetia) 10 MG tablet, Take 1 tablet by mouth Daily., Disp: 30 tablet, Rfl: 11    furosemide (LASIX) 40 MG tablet, Take 1 tablet by mouth Daily., Disp: , Rfl:     potassium chloride 10 MEQ CR tablet, TAKE 1 TO 2 BY MOUTH ONCE DAILY AS DIRECTED, Disp: , Rfl:     rivaroxaban (Xarelto) 20 MG tablet, Take 1 tablet by mouth Daily With Dinner., Disp: 90 tablet, Rfl: 3    valsartan (DIOVAN) 160 MG tablet, Take 1 tablet by mouth Daily., Disp: , Rfl:     Ventolin  (90 Base) MCG/ACT inhaler, Every 4 To 6 Hours As Needed as needed for Shortness Of Air, Disp: , Rfl:     amLODIPine (NORVASC) 5 MG tablet, Take 1 tablet by mouth Daily. Patient has been out for 1 week (Patient not taking: Reported on 2/23/2024), Disp: , Rfl:     esomeprazole (nexIUM) 40 MG capsule, Take 1 capsule by mouth Every Morning Before Breakfast. (Patient not taking: Reported on 11/17/2023), Disp: , Rfl:     loratadine (CLARITIN) 10 MG tablet, Take 1 tablet by mouth Daily. (Patient not taking: Reported on 11/17/2023), Disp: , Rfl:     varenicline (Chantix) 0.5 MG tablet, Take 1 tablet by mouth 2 (Two) Times a Day. (Patient not taking: Reported on 2/23/2024), Disp: 60 tablet, Rfl: 3  No current facility-administered medications for this visit.    Facility-Administered Medications Ordered in Other Visits:     Chlorhexidine Gluconate Cloth 2 % pads 1 application, 1 application , Topical, Q12H PRN, [place], Maura Lela, APRN    Allergies:   Allergies   Allergen Reactions    Lidocaine Hcl-Sodium Chloride Hives    Ceclor [Cefaclor] Rash       Objective     Physical Exam:   Vital Signs:   Vitals:    02/23/24 1334   BP: 134/86   Pulse: 84   Temp: 98.6 °F (37 °C)  "  SpO2: 94%   Weight: (!) 140 kg (309 lb)   Height: 188 cm (74.02\")     Body mass index is 39.66 kg/m².    Physical Exam  Vitals and nursing note reviewed.   Constitutional:       General: He is awake. He is not in acute distress.     Appearance: Normal appearance. He is obese. He is not ill-appearing.   HENT:      Head: Normocephalic and atraumatic.      Nose: Nose normal.      Mouth/Throat:      Mouth: Mucous membranes are moist.   Eyes:      General: Lids are normal.      Extraocular Movements: Extraocular movements intact.      Pupils: Pupils are equal, round, and reactive to light.   Cardiovascular:      Rate and Rhythm: Normal rate and regular rhythm.      Pulses: Normal pulses.   Pulmonary:      Effort: Pulmonary effort is normal. No respiratory distress.   Skin:     General: Skin is warm and dry.   Neurological:      General: No focal deficit present.      Mental Status: He is alert and oriented to person, place, and time.      Motor: Motor strength is normal.     Coordination: Coordination is intact.   Psychiatric:         Mood and Affect: Mood normal.         Speech: Speech normal.         Behavior: Behavior normal.       Neurological Exam  Mental Status  Awake and alert. Oriented to person, place, time and situation. Oriented to person, place, and time. Speech is normal. Language is fluent with no aphasia. Attention and concentration are normal. Fund of knowledge is appropriate for level of education.    Cranial Nerves  CN II: Right visual acuity: Counts fingers. Left visual acuity: Counts fingers. Visual fields full to confrontation. Reports more noticeable diplopia on the left .  CN III, IV, VI: Extraocular movements intact bilaterally. Normal lids and orbits bilaterally. Pupils equal round and reactive to light bilaterally.  CN V: Facial sensation is normal.  CN VII: Full and symmetric facial movement.  CN VIII: Hearing is normal to speech .  CN XI: Shoulder shrug strength is normal.  CN XII: Tongue " midline without atrophy or fasciculations.    Motor  Normal muscle bulk throughout. No fasciculations present. Normal muscle tone. Strength is 5/5 throughout all four extremities.    Sensory  Light touch is normal in upper and lower extremities. Pinprick is normal in upper and lower extremities.     Coordination    Finger-to-nose, rapid alternating movements and heel-to-shin normal bilaterally without dysmetria.  No obvious dysmetria .    Gait  Casual gait is normal including stance, stride, and arm swing.       Modified [place] Score: 0        0  No Symptoms    1 No significant disability. Able to carry out all usual activities, despite some symptoms.    2 Slight disability. Able to look after own affairs without assistance, but unable to carry out all previous activities.    3 Moderate disability. Requires some help, but able to walk unassisted.    4 Moderately severe disability. Unable to attend to own bodily needs without assistance, and unable to walk unassisted.    5 Severe disability. Requires constant nursing care and attention, bedridden, incontinent.    6 Dead      PHQ-9 Depression Screening  Little interest or pleasure in doing things? 0-->not at all   Feeling down, depressed, or hopeless? 0-->not at all   Trouble falling or staying asleep, or sleeping too much?     Feeling tired or having little energy?     Poor appetite or overeating?     Feeling bad about yourself - or that you are a failure or have let yourself or your family down?     Trouble concentrating on things, such as reading the newspaper or watching television?     Moving or speaking so slowly that other people could have noticed? Or the opposite - being so fidgety or restless that you have been moving around a lot more than usual?     Thoughts that you would be better off dead, or of hurting yourself in some way?     PHQ-9 Total Score 0   If you checked off any problems, how difficult have these problems made it for you to do your work, take  care of things at home, or get along with other people?           [facility] Fall Risk Clinician Key Questions   Have you fallen in the past year?: No  Do you feel unsteady with walking?: No  Are you worried about falling?: No      Lipid Panel          11/17/2023    14:17 2/23/2024    14:31   Lipid Panel   Total Cholesterol 230  232    Triglycerides 246  200    HDL Cholesterol 24  26    VLDL Cholesterol 46  38    LDL Cholesterol  160  168    LDL/HDL Ratio 6.53  6.38            Assessment / Plan      Assessment/Plan:   Diagnoses and all orders for this visit:     1. History of stroke, MRI occult right brainstem and left cerebellar (Primary), etiology paroxysmal atrial fibrillation  -Continue Xarelto 20 mg and ASA 81 mg daily  -Continue risk factor modification strategies  -911 for strokelike symptoms  -STOP BANG score 6 indicating high risk of obstructive sleep apnea; patient would like to focus on increased physical activity and weight loss; not interested in a sleep study at this time  -follow up in stroke clinic on an annual basis or call if need for sooner appointment      2. Essential hypertension  -Continue antihypertensive medications per primary care and cardiology recommendations  -Goal blood pressure <140/80; advised patient to contact PCP or cardiology should his SBP remain consistently > 160 as this increases risk of intercerebral hemorrhage with chronic anticoagulation use.     3. Hyperlipidemia LDL goal <70  -LDL admission 73. This has increased at his most recent lipid panel recheck to 160   -continue Zetia for now  -Reordered lipid panel and cmp to be performed today  -resume atorvastatin 80 mg if liver enzymes are at an acceptable level      4. Type 2 diabetes mellitus with hyperglycemia, without long-term current use of insulin  -A1c on admission 6.40, goal <7  -Goal blood glucose <140  -Patient expressed interest in semaglutide, advised him to contact his pcp  -recently gained considerable amount of  weight per patient report due to eating more fast food. He is now motivated to do better and improve his diet.      5. Paroxysmal A-fib  -Continue rivaroxaban (Xarelto) 20 MG   -Keep scheduled appointment with Dr. Guan     6. Tobacco use  -Patient educated on the importance of smoking cessation to decrease risk of future cardiovascular and cerebrovascular events.  Also discussed how tobacco use contributes to progression of atherosclerotic disease and lead to pulmonary complications.  Patient voices understanding and is willing to continue to cut back.       Discussed the importance of medication compliance (Xarelto 20 mg, ASA 81 mg, and atorvastatin 80 mg) and lifestyle modifications (adequate blood pressure control, adequate control of hyperlipidemia, adequate glycemic control, increased physical activity, smoking cessation and healthy diet) to help reduce the risk of future cerebrovascular events.  Also discussed the signs symptoms that would warrant the patient return back to the emergency department including unilateral weakness, unilateral numbness, visual disturbances, loss of balance, speech difficulties, and/or a sudden severe headache.  Patient voices understanding.  He has been encouraged to contact our office should he have any questions or concerns prior to his next follow-up appointment.  Follow Up:   Return in about 1 year (around 2/23/2025).    KYLAH Cooper  [facility] [place] [facility]

## 2024-02-26 NOTE — PROGRESS NOTES
The patient's lipid panel has not improved.  Can you confirm that he is taking Zetia still? His LFTs are still elevated therefor we cannot start him back on atorvastatin.  He will need to follow-up with his PCP to have further testing to determine why his LFTs are still elevated.

## 2024-04-22 ENCOUNTER — TELEPHONE (OUTPATIENT)
Dept: CARDIOLOGY | Facility: HOSPITAL | Age: 59
End: 2024-04-22
Payer: COMMERCIAL

## 2024-04-23 ENCOUNTER — PATIENT ROUNDING (BHMG ONLY) (OUTPATIENT)
Dept: FAMILY MEDICINE CLINIC | Facility: CLINIC | Age: 59
End: 2024-04-23
Payer: COMMERCIAL

## 2024-04-23 ENCOUNTER — OFFICE VISIT (OUTPATIENT)
Dept: FAMILY MEDICINE CLINIC | Facility: CLINIC | Age: 59
End: 2024-04-23
Payer: COMMERCIAL

## 2024-04-23 ENCOUNTER — LAB (OUTPATIENT)
Dept: FAMILY MEDICINE CLINIC | Facility: CLINIC | Age: 59
End: 2024-04-23
Payer: COMMERCIAL

## 2024-04-23 VITALS
OXYGEN SATURATION: 97 % | RESPIRATION RATE: 16 BRPM | SYSTOLIC BLOOD PRESSURE: 138 MMHG | DIASTOLIC BLOOD PRESSURE: 80 MMHG | HEIGHT: 74 IN | WEIGHT: 307.6 LBS | BODY MASS INDEX: 39.47 KG/M2 | TEMPERATURE: 98 F | HEART RATE: 68 BPM

## 2024-04-23 DIAGNOSIS — R05.1 ACUTE COUGH: ICD-10-CM

## 2024-04-23 DIAGNOSIS — R79.89 ELEVATED LFTS: ICD-10-CM

## 2024-04-23 DIAGNOSIS — Z12.11 COLON CANCER SCREENING: ICD-10-CM

## 2024-04-23 DIAGNOSIS — Z72.0 TOBACCO USE: ICD-10-CM

## 2024-04-23 DIAGNOSIS — Z71.85 IMMUNIZATION COUNSELING: ICD-10-CM

## 2024-04-23 DIAGNOSIS — Z00.00 ENCOUNTER FOR MEDICAL EXAMINATION TO ESTABLISH CARE: ICD-10-CM

## 2024-04-23 DIAGNOSIS — Z12.5 PROSTATE CANCER SCREENING: ICD-10-CM

## 2024-04-23 DIAGNOSIS — K75.81 METABOLIC DYSFUNCTION-ASSOCIATED STEATOHEPATITIS (MASH): Primary | ICD-10-CM

## 2024-04-23 DIAGNOSIS — M79.89 LEG SWELLING: ICD-10-CM

## 2024-04-23 DIAGNOSIS — Z00.00 ENCOUNTER FOR MEDICAL EXAMINATION TO ESTABLISH CARE: Primary | ICD-10-CM

## 2024-04-23 LAB
BILIRUB UR QL STRIP: NEGATIVE
CLARITY UR: CLEAR
COLOR UR: YELLOW
DEPRECATED RDW RBC AUTO: 44.4 FL (ref 37–54)
ERYTHROCYTE [DISTWIDTH] IN BLOOD BY AUTOMATED COUNT: 13.6 % (ref 12.3–15.4)
GLUCOSE UR STRIP-MCNC: NEGATIVE MG/DL
HBA1C MFR BLD: 7 % (ref 4.8–5.6)
HCT VFR BLD AUTO: 47.4 % (ref 37.5–51)
HGB BLD-MCNC: 15.8 G/DL (ref 13–17.7)
HGB UR QL STRIP.AUTO: NEGATIVE
HOLD SPECIMEN: NORMAL
KETONES UR QL STRIP: NEGATIVE
LEUKOCYTE ESTERASE UR QL STRIP.AUTO: NEGATIVE
MCH RBC QN AUTO: 29.9 PG (ref 26.6–33)
MCHC RBC AUTO-ENTMCNC: 33.3 G/DL (ref 31.5–35.7)
MCV RBC AUTO: 89.8 FL (ref 79–97)
NITRITE UR QL STRIP: NEGATIVE
PH UR STRIP.AUTO: 7 [PH] (ref 5–8)
PLATELET # BLD AUTO: 202 10*3/MM3 (ref 140–450)
PMV BLD AUTO: 9.7 FL (ref 6–12)
PROT UR QL STRIP: NEGATIVE
RBC # BLD AUTO: 5.28 10*6/MM3 (ref 4.14–5.8)
SP GR UR STRIP: 1.01 (ref 1–1.03)
UROBILINOGEN UR QL STRIP: NORMAL
WBC NRBC COR # BLD AUTO: 10.47 10*3/MM3 (ref 3.4–10.8)

## 2024-04-23 PROCEDURE — 82465 ASSAY BLD/SERUM CHOLESTEROL: CPT

## 2024-04-23 PROCEDURE — 82977 ASSAY OF GGT: CPT

## 2024-04-23 PROCEDURE — 82607 VITAMIN B-12: CPT

## 2024-04-23 PROCEDURE — 84460 ALANINE AMINO (ALT) (SGPT): CPT

## 2024-04-23 PROCEDURE — G0103 PSA SCREENING: HCPCS

## 2024-04-23 PROCEDURE — 80061 LIPID PANEL: CPT

## 2024-04-23 PROCEDURE — 82247 BILIRUBIN TOTAL: CPT

## 2024-04-23 PROCEDURE — 83036 HEMOGLOBIN GLYCOSYLATED A1C: CPT

## 2024-04-23 PROCEDURE — 83010 ASSAY OF HAPTOGLOBIN QUANT: CPT

## 2024-04-23 PROCEDURE — 80050 GENERAL HEALTH PANEL: CPT

## 2024-04-23 PROCEDURE — 82172 ASSAY OF APOLIPOPROTEIN: CPT

## 2024-04-23 PROCEDURE — 83883 ASSAY NEPHELOMETRY NOT SPEC: CPT

## 2024-04-23 PROCEDURE — 99204 OFFICE O/P NEW MOD 45 MIN: CPT

## 2024-04-23 PROCEDURE — 36415 COLL VENOUS BLD VENIPUNCTURE: CPT | Performed by: FAMILY MEDICINE

## 2024-04-23 PROCEDURE — 82043 UR ALBUMIN QUANTITATIVE: CPT

## 2024-04-23 PROCEDURE — 84478 ASSAY OF TRIGLYCERIDES: CPT

## 2024-04-23 PROCEDURE — 83880 ASSAY OF NATRIURETIC PEPTIDE: CPT

## 2024-04-23 PROCEDURE — 82947 ASSAY GLUCOSE BLOOD QUANT: CPT

## 2024-04-23 PROCEDURE — 81003 URINALYSIS AUTO W/O SCOPE: CPT

## 2024-04-23 PROCEDURE — 84450 TRANSFERASE (AST) (SGOT): CPT

## 2024-04-23 RX ORDER — ATORVASTATIN CALCIUM 80 MG/1
80 TABLET, FILM COATED ORAL NIGHTLY
COMMUNITY

## 2024-04-23 RX ORDER — METOPROLOL SUCCINATE 50 MG/1
50 TABLET, EXTENDED RELEASE ORAL DAILY
COMMUNITY

## 2024-04-23 NOTE — PROGRESS NOTES
"    Office Note     Name: John Wolfe    : 1965     MRN: [id number]     Chief Complaint  Establish Care (Leg swelling from TAVR procedure done 2023/Cough at night when lay down)    History of Present Illness:  John Wolfe is a 58 y.o. male who presents today for establishment of care. Previous PCP was Dr. Kaminski at [facility], and [place].  He does follow with Dr. Guan, cardiologist regularly.  He reports that he saw cardiologist at the beginning of this month.  He reports that he had an echocardiogram performed and was told that it \"looked good.\"  He denies any recent medication changes.  He also follows with a neurologist due to history of stroke.  He saw neurologist last in 2024. He has a PMH of HTN, afib, HLD, type 2 DM, CAD s/p TAVR (2023), left cerebellar infarct.  He does report he continues take his Xarelto and aspirin daily.  He reports being faithful in taking his Xarelto.    He does report chronic history of bilateral lower leg swelling since his TAVR procedure in 2023.  He denies any worsening of his leg swelling.  He reports that his cardiologist increased his Lasix to twice per day to help with the leg swelling.  He has tried compression stockings in the past but does not wear them at present time.  He does admit that he went back to eating a lot of fast food recently. He denies any pain in legs.  He denies redness to the legs.    He reports he recently went to an urgent care and was diagnosed with a sinus infection.  He reports that he had some nighttime cough over the past 2 weeks.  He reports having some drainage at night.  He reports that he was prescribed Augmentin and this has nearly resolved the cough.  He denies any productive cough.  He denies any hemoptysis.  He denies any shortness of air or wheezing.  He reports his congestion, runny nose, and postnasal drainage have improved although he does continue to have some postnasal drainage.  He denies any " chest pain.  No fever or chills.  He has not needed to use his inhaler.  He feels like his acid reflux is well-controlled on present time.  He has had no belching.  He is not taking his Nexium currently.    He denies any acute health concerns, reports that he would like to be established.    Subjective     Review of Systems:   Review of Systems   Constitutional:  Negative for chills, fatigue, fever and unexpected weight loss.   HENT:  Negative for congestion, rhinorrhea, sinus pressure, trouble swallowing and voice change.    Eyes:  Negative for blurred vision and double vision.   Respiratory:  Positive for cough. Negative for shortness of breath and wheezing.    Cardiovascular:  Positive for leg swelling. Negative for chest pain and palpitations.   Gastrointestinal:  Negative for abdominal pain, blood in stool, constipation, diarrhea, nausea and vomiting.   Neurological:  Negative for dizziness, syncope, weakness, light-headedness and headache.       I have reviewed the patients family history, social history, past medical history, past surgical history and have updated it as appropriate.     Past Medical History:   Past Medical History:   Diagnosis Date    Aortic valve stenosis     Diabetes mellitus     GERD (gastroesophageal reflux disease)     Heart murmur     Hyperlipidemia     Hypertension     Stroke        Past Surgical History:   Past Surgical History:   Procedure Laterality Date    AORTIC VALVE REPAIR/REPLACEMENT N/A 2/23/2023    Procedure: TRANSCATHETER AORTIC VALVE REPLACEMENT,TEODORO;  Surgeon: Clemente Barger MD;  Location: [facility];  Service: Cardiothoracic;  Laterality: N/A;  FLOURO  DOSE  CONTRAST    AORTIC VALVE REPAIR/REPLACEMENT N/A 2/23/2023    Procedure: Transfemoral Transcatheter Aortic Valve Replacement;  Surgeon: Edwin Guan MD;  Location: [facility];  Service: Cardiovascular;  Laterality: N/A;    CARDIAC CATHETERIZATION N/A 01/26/2023    Procedure: Left Heart Cath;   Surgeon: Edwin Guan MD;  Location: [facility] INVASIVE LOCATION;  Service: Cardiology;  Laterality: N/A;    CHOLECYSTECTOMY      NASAL SEPTAL RECONSTRUCTION         Family History:   Family History   Problem Relation Age of Onset    Hypertension Mother     Hyperlipidemia Mother     Esophageal cancer Mother     Stroke Father     Hypertension Father     Hyperlipidemia Father        Social History:   Social History     Socioeconomic History    Marital status: Significant Other    Number of children: 1   Tobacco Use    Smoking status: Every Day     Current packs/day: 1.00     Average packs/day: 1 pack/day for 35.0 years (35.0 ttl pk-yrs)     Types: Cigarettes     Passive exposure: Current    Smokeless tobacco: Current     Types: Snuff   Vaping Use    Vaping status: Some Days    Substances: Nicotine, Flavoring    Devices: Refillable tank    Passive vaping exposure: Yes   Substance and Sexual Activity    Alcohol use: Yes     Comment: rarely    Drug use: Never    Sexual activity: Yes     Partners: Female       Immunizations:   Immunization History   Administered Date(s) Administered    Influenza Seasonal Injectable 10/01/2019        Medications:     Current Outpatient Medications:     atorvastatin (LIPITOR) 80 MG tablet, Take 1 tablet by mouth Every Night., Disp: , Rfl:     bisoprolol (ZEBeta) 5 MG tablet, Take 0.5 tablets by mouth Every Night., Disp: , Rfl:     cetirizine (zyrTEC) 10 MG tablet, Take 1 tablet by mouth Daily., Disp: , Rfl:     ezetimibe (Zetia) 10 MG tablet, Take 1 tablet by mouth Daily., Disp: 30 tablet, Rfl: 11    furosemide (LASIX) 40 MG tablet, Take 1 tablet by mouth 2 (Two) Times a Day., Disp: , Rfl:     metoprolol succinate XL (TOPROL-XL) 50 MG 24 hr tablet, Take 1 tablet by mouth Daily., Disp: , Rfl:     rivaroxaban (Xarelto) 20 MG tablet, Take 1 tablet by mouth Daily With Dinner., Disp: 90 tablet, Rfl: 3    valsartan (DIOVAN) 160 MG tablet, Take 1 tablet by mouth Daily., Disp: , Rfl:      "Ventolin  (90 Base) MCG/ACT inhaler, Every 4 To 6 Hours As Needed as needed for Shortness Of Air, Disp: , Rfl:     aspirin 81 MG chewable tablet, Chew 1 tablet Daily., Disp: 30 tablet, Rfl: 0  No current facility-administered medications for this visit.    Facility-Administered Medications Ordered in Other Visits:     Chlorhexidine Gluconate Cloth 2 % pads 1 application, 1 application , Topical, Q12H PRN, Maura Solis APRN    Allergies:   Allergies   Allergen Reactions    Lidocaine Hcl-Sodium Chloride Hives    Ceclor [Cefaclor] Rash       Objective     Vital Signs  Vitals:    04/23/24 1312 04/23/24 1338   BP: 138/80    Pulse: 68 68   Resp: 16    Temp: 98 °F (36.7 °C)    SpO2: 94% 97%   Weight: (!) 140 kg (307 lb 9.6 oz)    Height: 188 cm (74.02\")      Estimated body mass index is 39.48 kg/m² as calculated from the following:    Height as of this encounter: 188 cm (74.02\").    Weight as of this encounter: 140 kg (307 lb 9.6 oz).    Class 2 Severe Obesity (BMI >=35 and <=39.9). Obesity-related health conditions include the following: hypertension, coronary heart disease, diabetes mellitus, dyslipidemias, and GERD. Obesity is newly identified. BMI is is above average; BMI management plan is completed. We discussed portion control and increasing exercise.      Physical Exam  Vitals and nursing note reviewed.   Constitutional:       General: He is not in acute distress.     Appearance: Normal appearance. He is not ill-appearing, toxic-appearing or diaphoretic.   HENT:      Head: Normocephalic and atraumatic.   Eyes:      Extraocular Movements: Extraocular movements intact.   Cardiovascular:      Rate and Rhythm: Normal rate and regular rhythm.      Heart sounds: No murmur heard.     No friction rub. No gallop.   Pulmonary:      Effort: Pulmonary effort is normal. No respiratory distress.      Breath sounds: No wheezing, rhonchi or rales.   Musculoskeletal:      Cervical back: Normal range of motion.      " Right lower leg: No tenderness. Edema present.      Left lower leg: No tenderness. Edema present.      Right ankle: Swelling present. Normal pulse.      Left ankle: Swelling present. Normal pulse.      Comments: Right lower leg circumference measured 10 cm below the tibial tuberosity = 44 cm  Left lower leg circumference measured 10 cm below the tibial tuberosity = 44 cm  No erythema to either lower extremity, no warmth to palpation, no calf tenderness bilaterally   Skin:     Coloration: Skin is not pale.   Neurological:      Mental Status: He is alert and oriented to person, place, and time. Mental status is at baseline.   Psychiatric:         Mood and Affect: Mood normal.         Thought Content: Thought content normal.          Assessment and Plan     1. Encounter for medical examination to establish care  -We will try to obtain his medical records from his previous PCP.  We will look at most recent blood work, last visit, and get a copy of his immunizations.  -PHQ-2 Total Score: 0, he denies concerns for anxiety or depression.  - MicroAlbumin, Urine, Random - Urine, Clean Catch; Future  - CBC (No Diff); Future  - Comprehensive Metabolic Panel; Future  - Hemoglobin A1c; Future  - Lipid Panel; Future  - Vitamin B12; Future  - TSH Rfx On Abnormal To Free T4; Future  - Urinalysis With Culture If Indicated -; Future    2. Tobacco use  -Did encourage tobacco cessation.  Did discuss multiple options for cessation.  He is not agreeable to stopping at present time.  Did discuss need for low-dose chest CT for lung cancer screening.  He is agreeable at present time.  -  CT Chest Low Dose Cancer Screening WO; Future    3. Colon cancer screening  -Discussed need for colon cancer screening.  Discussed colonoscopy versus Cologuard.  Discussed risks and benefits of each.  He is agreeable to Cologuard at present time.  - Cologuard - Stool, Per Rectum; Future    4. Immunization counseling  -We will obtain his immunization record  from his previous PCP before administering vaccines here in the office.  We will revisit once records are obtained.    5. Prostate cancer screening  - PSA Screen; Future    6. Leg swelling  -Patient does have leg swelling in the office, he reports this is chronic and unchanged.  proBNP has been ordered due to his cardiac history and presence of leg swelling.  I have advised continue with recommendations by cardiologist.  Also advised supportive care options: Reducing sodium intake, elevating legs when nonweightbearing, and wearing compression stockings during the day.  - proBNP; Future    7. Elevated LFTs  - CA Fibrosure; Future    8. Acute cough  -Lungs are clear to auscultation at present time.  Patient reports cough has nearly resolved.  We did discuss that if he has chronic cough this could be due to possible COPD due to his significant tobacco use history.  He denies chronic cough.  If cough recurs, he has been advised to return to care.     I spent 52 minutes caring for John Wolfe on this date of service. This time includes time spent by me in the following activities: preparing for the visit, reviewing tests, performing a medically appropriate examination and/or evaluation, counseling and educating the patient/family/caregiver, ordering medications, tests, or procedures and documenting information in the medical record.    Follow Up  Return in about 1 month (around 5/23/2024) for Annual physical.    ORLANDO Dill

## 2024-04-23 NOTE — PROGRESS NOTES
A [employer] message has been sent to the patient for PATIENT  [place] [facility] [place]

## 2024-04-24 DIAGNOSIS — R79.89 ELEVATED LFTS: Primary | ICD-10-CM

## 2024-04-24 LAB
ALBUMIN SERPL-MCNC: 4.5 G/DL (ref 3.5–5.2)
ALBUMIN UR-MCNC: <1.2 MG/DL
ALBUMIN/GLOB SERPL: 1.6 G/DL
ALP SERPL-CCNC: 81 U/L (ref 39–117)
ALT SERPL W P-5'-P-CCNC: 78 U/L (ref 1–41)
ANION GAP SERPL CALCULATED.3IONS-SCNC: 11.6 MMOL/L (ref 5–15)
AST SERPL-CCNC: 54 U/L (ref 1–40)
BILIRUB SERPL-MCNC: 0.5 MG/DL (ref 0–1.2)
BUN SERPL-MCNC: 13 MG/DL (ref 6–20)
BUN/CREAT SERPL: 14 (ref 7–25)
CALCIUM SPEC-SCNC: 9.3 MG/DL (ref 8.6–10.5)
CHLORIDE SERPL-SCNC: 102 MMOL/L (ref 98–107)
CHOLEST SERPL-MCNC: 85 MG/DL (ref 0–200)
CO2 SERPL-SCNC: 27.4 MMOL/L (ref 22–29)
CREAT SERPL-MCNC: 0.93 MG/DL (ref 0.76–1.27)
EGFRCR SERPLBLD CKD-EPI 2021: 94.6 ML/MIN/1.73
GLOBULIN UR ELPH-MCNC: 2.9 GM/DL
GLUCOSE SERPL-MCNC: 140 MG/DL (ref 65–99)
HDLC SERPL-MCNC: 24 MG/DL (ref 40–60)
LDLC SERPL CALC-MCNC: 45 MG/DL (ref 0–100)
LDLC/HDLC SERPL: 1.89 {RATIO}
NT-PROBNP SERPL-MCNC: 127 PG/ML (ref 0–900)
POTASSIUM SERPL-SCNC: 3.7 MMOL/L (ref 3.5–5.2)
PROT SERPL-MCNC: 7.4 G/DL (ref 6–8.5)
PSA SERPL-MCNC: 0.55 NG/ML (ref 0–4)
SODIUM SERPL-SCNC: 141 MMOL/L (ref 136–145)
TRIGL SERPL-MCNC: 78 MG/DL (ref 0–150)
TSH SERPL DL<=0.05 MIU/L-ACNC: 2.83 UIU/ML (ref 0.27–4.2)
VIT B12 BLD-MCNC: 899 PG/ML (ref 211–946)
VLDLC SERPL-MCNC: 16 MG/DL (ref 5–40)

## 2024-04-25 NOTE — PROGRESS NOTES
Attempted to call patient with results. Left a detailed voicemail with patients results. Instructed patient to call us if they have any questions or concerns. Also told patient to call us and let us know if he is interested in doing the liver U/S.

## 2024-04-26 DIAGNOSIS — K76.0 STEATOSIS OF LIVER: Primary | ICD-10-CM

## 2024-04-26 LAB
A2 MACROGLOB SERPL-MCNC: 329 MG/DL (ref 110–276)
ALT SERPL W P-5'-P-CCNC: 91 IU/L (ref 0–55)
APO A-I SERPL-MCNC: 90 MG/DL (ref 101–178)
AST SERPL W P-5'-P-CCNC: 65 IU/L (ref 0–40)
BILIRUB SERPL-MCNC: 0.4 MG/DL (ref 0–1.2)
CHOLEST SERPL-MCNC: 88 MG/DL (ref 100–199)
FIBROSIS SCORING:: ABNORMAL
FIBROSIS STAGE SERPL QL: ABNORMAL
GGT SERPL-CCNC: 55 IU/L (ref 0–65)
GLUCOSE SERPL-MCNC: 144 MG/DL (ref 70–99)
HAPTOGLOB SERPL-MCNC: 164 MG/DL (ref 29–370)
LABORATORY COMMENT REPORT: ABNORMAL
LIVER FIBR SCORE SERPL CALC.FIBROSURE: 0.65 (ref 0–0.21)
LIVER STEATOSIS GRADE SERPL QL: ABNORMAL
LIVER STEATOSIS SCORE SERPL: 0.69 (ref 0–0.4)
NASH GRADE SERPL QL: ABNORMAL
NASH INTERPRETATION SERPL-IMP: ABNORMAL
NASH SCORE SERPL: 0.88 (ref 0–0.25)
NASH SCORING: ABNORMAL
STEATOSIS SCORING: ABNORMAL
TEST PERFORMANCE INFO SPEC: ABNORMAL
TEST PERFORMANCE INFO SPEC: ABNORMAL
TRIGL SERPL-MCNC: 102 MG/DL (ref 0–149)

## 2024-04-26 NOTE — PROGRESS NOTES
Patient was informed of results and verbalized good understanding and appreciation. He is ok with doing the U/S and seeing a GI Dr. He would like to stay within [facility] for these

## 2024-04-29 ENCOUNTER — TRANSCRIBE ORDERS (OUTPATIENT)
Dept: ADMINISTRATIVE | Facility: HOSPITAL | Age: 59
End: 2024-04-29
Payer: COMMERCIAL

## 2024-04-29 ENCOUNTER — TELEPHONE (OUTPATIENT)
Dept: FAMILY MEDICINE CLINIC | Facility: CLINIC | Age: 59
End: 2024-04-29
Payer: COMMERCIAL

## 2024-04-29 DIAGNOSIS — R79.89 ELEVATED LFTS: ICD-10-CM

## 2024-04-29 DIAGNOSIS — K76.0 STEATOSIS OF LIVER: Primary | ICD-10-CM

## 2024-04-29 NOTE — TELEPHONE ENCOUNTER
Caller: John Wolfe    Relationship: Self    Best call back number: [phone]     What is the best time to reach you: ANYTIME    Who are you requesting to speak with (clinical staff, provider,  specific staff member):   Do you know the name of the person who called:     What was the call regarding: UNKNOWN, THE PATIENT STATED SOMEONE CALLED FOR HIM.     PLEASE CALL THE PATIENT BACK

## 2024-05-01 DIAGNOSIS — I48.0 PAROXYSMAL A-FIB: ICD-10-CM

## 2024-05-01 NOTE — TELEPHONE ENCOUNTER
Rx Refill Note  Requested Prescriptions     Pending Prescriptions Disp Refills    rivaroxaban (Xarelto) 20 MG tablet 90 tablet 3     Sig: Take 1 tablet by mouth Daily With Dinner.      Last office visit with prescribing clinician: 2/23/2024   Last telemedicine visit with prescribing clinician: Visit date not found   Next office visit with prescribing clinician: 2/28/2025   Garima Figueroa MA  05/01/24, 15:28 EDT

## 2024-05-22 NOTE — PROGRESS NOTES
Male Physical Note      Date: 2024   Patient Name: John Wolfe  : 1965   MRN: [id number]     Chief Complaint:    Chief Complaint   Patient presents with    Annual Exam       History of Present Illness: John Wolfe is a 58 y.o. male who is here today for their annual health maintenance and physical.     He reports that he continues to do well with all of his prescribed medications and denies any side effects.  Reports that his blood pressure runs well at home.  He does continue to take his blood thinner faithfully.  He is up-to-date on seeing his cardiologist and neurologist.    Most recent FibroSure did show he has liver fibrosis.  He reports that he has drastically tried to change his diet, is reducing carbohydrate intake and eating more protein and trying to cut out fatty foods.  He reports that he is biking 10 miles per day on the exercise bike.  He denies any family history of thyroid cancer or multiple endocrine neoplasia.  He denies any history of this himself.  He denies history of pancreatitis.    He reports that he feels well overall.  He denies any cardiovascular, respiratory, GI, or neurologic symptoms.  He denies any numbness or tingling pains in his feet.    He denies needing any refills of medications at present time.    Subjective      Review of Systems:   Review of Systems   Constitutional:  Negative for chills, fatigue, fever and unexpected weight loss.   HENT:  Negative for trouble swallowing and voice change.    Eyes:  Negative for blurred vision and double vision.   Respiratory:  Negative for cough, shortness of breath and wheezing.    Cardiovascular:  Negative for chest pain, palpitations and leg swelling.   Gastrointestinal:  Negative for abdominal pain, blood in stool, constipation, diarrhea, nausea and vomiting.   Genitourinary:  Negative for hematuria.   Neurological:  Negative for dizziness, syncope, light-headedness and headache.       Past Medical History,   Name of caller: Patient    Reason for Call:  Patient called was seen in ER and has an infection.     Surgeon:  Dr. Lopez    Recent Surgery:  Yes.    If yes, when & what type:  Left seed-localized breast biopsy, 6 o'clock 1/18/19      Best phone number to reach pt at is: [phone] (W)  Ok to leave a message with medical info? No    Pharmacy preferred (if calling for a refill): N/A           Social History, Family History and Care Team were all reviewed with patient and updated as appropriate.     Medications:     Current Outpatient Medications:     aspirin 81 MG chewable tablet, Chew 1 tablet Daily., Disp: 30 tablet, Rfl: 0    atorvastatin (LIPITOR) 80 MG tablet, Take 1 tablet by mouth Every Night., Disp: , Rfl:     bisoprolol (ZEBeta) 5 MG tablet, Take 0.5 tablets by mouth Every Night., Disp: , Rfl:     cetirizine (zyrTEC) 10 MG tablet, Take 1 tablet by mouth Daily., Disp: , Rfl:     ezetimibe (Zetia) 10 MG tablet, Take 1 tablet by mouth Daily., Disp: 30 tablet, Rfl: 11    furosemide (LASIX) 40 MG tablet, Take 1 tablet by mouth 2 (Two) Times a Day., Disp: , Rfl:     metoprolol succinate XL (TOPROL-XL) 50 MG 24 hr tablet, Take 1 tablet by mouth Daily., Disp: , Rfl:     rivaroxaban (Xarelto) 20 MG tablet, Take 1 tablet by mouth Daily With Dinner., Disp: 90 tablet, Rfl: 3    valsartan (DIOVAN) 160 MG tablet, Take 1 tablet by mouth Daily., Disp: , Rfl:     Ventolin  (90 Base) MCG/ACT inhaler, Every 4 To 6 Hours As Needed as needed for Shortness Of Air (Patient not taking: Reported on 5/23/2024), Disp: , Rfl:   No current facility-administered medications for this visit.    Facility-Administered Medications Ordered in Other Visits:     Chlorhexidine Gluconate Cloth 2 % pads 1 application, 1 application , Topical, Q12H PRN, Maura Solis APRN    Allergies:   Allergies   Allergen Reactions    Lidocaine Hcl-Sodium Chloride Hives    Ceclor [Cefaclor] Rash       Immunizations:  Health Maintenance Summary            Overdue - DIABETIC EYE EXAM (Yearly) Never done      No completion, postpone, or frequency change history exists for this topic.              Overdue - Hepatitis B (1 of 3 - 19+ 3-dose series) Never done      No completion, postpone, or frequency change history exists for this topic.              Overdue - TDAP/TD VACCINES (1 - Tdap) Never done      No completion, postpone, or  frequency change history exists for this topic.              Overdue - ZOSTER VACCINE (1 of 2) Never done      No completion, postpone, or frequency change history exists for this topic.              Overdue - HEPATITIS C SCREENING (Once) Never done      No completion, postpone, or frequency change history exists for this topic.              Overdue - COVID-19 Vaccine (1 - 2023-24 season) Never done      No completion, postpone, or frequency change history exists for this topic.              Ordered - LUNG CANCER SCREENING (Yearly) Ordered on 4/23/2024 02/13/2023  CT Angio TAVR Chest Abdomen Pelvis    01/08/2023  CT ANGIOGRAM CHEST PULMONARY EMBOLISM              INFLUENZA VACCINE (Yearly - August to March) Next due on 8/1/2024      No completion, postpone, or frequency change history exists for this topic.              HEMOGLOBIN A1C (Every 6 Months) Next due on 10/23/2024      04/23/2024  Hemoglobin A1C component of Hemoglobin A1c    02/20/2023  Hemoglobin A1C component of Hemoglobin A1c    01/26/2023  Hemoglobin A1C component of Hemoglobin A1c              LIPID PANEL (Yearly) Next due on 4/23/2025 04/23/2024  Lipid Panel    02/23/2024  Lipid Panel    11/17/2023  Lipid Panel    01/26/2023  Lipid Panel              URINE MICROALBUMIN (Yearly) Next due on 4/23/2025 04/23/2024  Microalbumin, Urine component of MicroAlbumin, Urine, Random - Urine, Clean Catch              BMI FOLLOWUP (Yearly) Next due on 4/23/2025 04/23/2024  SmartData: WORKFLOW - QUALITY MEASUREMENT - DOCUMENTED WEIGHT FOLLOW-UP PLAN    04/23/2024  SmartData: WORKFLOW - QUALITY MEASUREMENT - BMI FOLLOW UP CARE PLAN DOCUMENTED              ANNUAL PHYSICAL (Yearly) Next due on 5/23/2025 05/23/2024  Done              DIABETIC FOOT EXAM (Yearly) Next due on 5/23/2025 05/23/2024  Done              COLORECTAL CANCER SCREENING (COLOGUARD - Every 3 Years) Next due on 5/14/2027 05/14/2024  Cologuard component of Cologuard  "- Stool, Per Rectum              Pneumococcal Vaccine 0-64 (Series Information) Completed      05/23/2024  Imm Admin: Pneumococcal Conjugate 20-Valent (PCV20)                    Orders Placed This Encounter   Procedures    Pneumococcal Conjugate Vaccine 20-Valent (PCV20)       Colorectal Screening:  Pending scheduling  Last Completed Colonoscopy            COLORECTAL CANCER SCREENING (COLOGUARD - Every 3 Years) Next due on 5/14/2027 05/14/2024  Cologuard component of Cologuard - Stool, Per Rectum                  CT for Smoker (Age 50-80, 20pk yr within last 15 years):  Pending scheduling   Bone Density/DEXA (high risk): N/A  Hep C (Age 18-79 once):  Negative  HIV (Age 15-65 once) : Deferred  PSA (Over age 50, C Level Recommendation):  Not elevated  US Aorta (For male smokers, age 65):  Deferred  A1c:   Hemoglobin A1C   Date Value Ref Range Status   04/23/2024 7.00 (H) 4.80 - 5.60 % Final     Lipid panel: No results found for: \"LABLIPI\"    The ASCVD Risk score (Marlen TANG, et al., 2019) failed to calculate for the following reasons:    The patient has a prior MI or stroke diagnosis    Dermatology: Not up to date  Ophthalmologist: Not up to date  Dentist: Up to date    Tobacco Use: High Risk (5/23/2024)    Patient History     Smoking Tobacco Use: Every Day     Smokeless Tobacco Use: Current     Passive Exposure: Current       Social History     Substance and Sexual Activity   Alcohol Use Yes    Comment: rarely        Social History     Substance and Sexual Activity   Drug Use Never        Diet/Physical activity: Reports reducing his carbohydrate intake and increasing protein, regularly engaging in physical activity    Sexual health: No concerns    PHQ-2 Depression Screening  PHQ-9 Total Score: 0      Measures:   Advanced Care Planning:   Patient does not have an advance directive, declines further assistance.    Smoking Cessation:   3-10 mintues spent counseling Will try to cut down     Objective     Physical " "Exam:  Vital Signs:   Vitals:    05/23/24 1405   BP: 132/76   BP Location: Right arm   Pulse: 64   Temp: 98 °F (36.7 °C)   TempSrc: Temporal   SpO2: 96%   Weight: (!) 139 kg (305 lb 12.8 oz)   Height: 188 cm (74.02\")     Body mass index is 39.25 kg/m².     Physical Exam  Vitals and nursing note reviewed.   Constitutional:       General: He is not in acute distress.     Appearance: Normal appearance. He is not ill-appearing, toxic-appearing or diaphoretic.   HENT:      Head: Normocephalic and atraumatic.   Eyes:      Extraocular Movements: Extraocular movements intact.   Cardiovascular:      Rate and Rhythm: Normal rate and regular rhythm.      Pulses:           Dorsalis pedis pulses are 2+ on the right side and 2+ on the left side.        Posterior tibial pulses are 2+ on the right side and 2+ on the left side.      Heart sounds: No murmur heard.     No friction rub. No gallop.   Pulmonary:      Effort: Pulmonary effort is normal. No respiratory distress.      Breath sounds: No wheezing, rhonchi or rales.   Musculoskeletal:      Cervical back: Normal range of motion.      Right lower leg: No tenderness. Edema present.      Left lower leg: No tenderness. Edema present.      Right ankle: Normal pulse.      Left ankle: Normal pulse.      Comments: Patient does have chronic lower extremity edema, no asymmetric swelling, no redness, no pain to palpation of either calf, patient reports edema is better today than normal   Feet:      Right foot:      Protective Sensation: 5 sites tested.  4 sites sensed.      Skin integrity: No ulcer, skin breakdown or callus.      Toenail Condition: Fungal disease present.     Left foot:      Protective Sensation: 5 sites tested.  5 sites sensed.      Skin integrity: No ulcer, skin breakdown or callus.      Toenail Condition: Fungal disease present.     Comments:      Skin:     Coloration: Skin is not pale.   Neurological:      Mental Status: He is alert and oriented to person, place, and " time. Mental status is at baseline.   Psychiatric:         Mood and Affect: Mood normal.         Thought Content: Thought content normal.            Assessment / Plan      Assessment/Plan:   Diagnoses and all orders for this visit:    1. Well adult exam (Primary)  -PHQ-2 Total Score: 0  -Encouraged yearly dermatology, optometry, and twice yearly dental exams.  Discussed ABCDEs of skin cancer and advised sunscreen for skin cancer prevention.  -Discussed age-appropriate preventative counseling and screening topics.  -Discussed need for Tdap vaccination and shingles vaccination.  Discussed purpose of each vaccine and risks/benefits.  At present time, patient declines both and accepts all risks.    2. Need for vaccination against Streptococcus pneumoniae  -Discussed need for pneumonia vaccination due to his comorbidities of tobacco use and type 2 diabetes.  Discussed purpose of vaccine, possible side effects, risks, and benefits.  He is agreeable to vaccination at present time.  -     Pneumococcal Conjugate Vaccine 20-Valent (PCV20)    3. Liver fibrosis  -Recent FibroSure test shows F3 fibrosis stage.  Ultrasound with elastography has been ordered.  Referral to gastroenterology has been made for further evaluation and treatment.  Stressed the importance of weight loss, changing diet to reduce carbohydrate and fatty food intake, and increasing physical activity, ideally 300 minutes of aerobic activity weekly.  -     Ambulatory Referral to Gastroenterology    4. Essential hypertension  -Blood pressure is acceptable in the office.  No changes at present time.  This is managed primarily by cardiology.    5. Hyperlipidemia LDL goal <70  -Most recent lipid panel shows LDL cholesterol is to goal.  He is tolerating statin well at present time.    6. Type 2 diabetes mellitus with hyperglycemia, without long-term current use of insulin  -Diabetic foot exam performed today.  Patient has been advised to check his feet daily and let  us know if he ever has any wounds.  We did discuss delayed wound healing and proper way to take care of feet.  Referral to podiatry has been made to help with cutting toenails.  Have advised using Vicks vapor rub on toenails to treat fungal disease.  -Stressed the importance of getting diabetic eye exam, discussed risk of retinopathy.  Patient reports that he will call and schedule appointment.  -Most recent hemoglobin A1c = 7.  We did discuss that I would like to place the patient on GLP-1 agonist to treat diabetes and to help with weight loss, which may have benefit with consideration to his fatty liver disease.  We discussed the medication at length.  He is not agreeable at present time.  We discussed other diabetes medications that we could consider, including metformin.  He reports he does not want to start any diabetes medications at present time.  He wants to continue with diet and exercise and we will reassess hemoglobin A1c in 2 months.    7. Obesity (BMI 30-39.9)  -Continue with dietary and physical activity recommendations listed above.    8. Tobacco use  -He has low-dose chest CT pending for lung cancer screening.  Have discussed risks of tobacco use at length: Increased risk of heart attacks and strokes and blood clots and lung cancer among other things.  Have advised tobacco cessation and did discuss multiple options.  He is not agreeable at present time.    9. Paroxysmal A-fib  -He is in normal sinus rhythm at present time.  He does continue to take his Xarelto as prescribed without side effects.  Primarily managed by cardiology.       Healthcare Maintenance:  Counseling provided based on age appropriate USPSTF guidelines.       John Wolfe voices understanding and acceptance of this advice and will call back with any further questions or concerns. AVS with preventive healthcare tips printed for patient.     Follow Up:   Return in about 2 months (around 7/23/2024) for Recheck.    At [facility], we believe that sharing information builds trust and better relationships. You are receiving this note because you recently visited [facility]. It is possible you will see health information before a provider has talked with you about it. This kind of information can be easy to misunderstand. To help you fully understand what it means for your health, we urge you to discuss this note with your provider.    Darleen Hayes PA-C  [facility]

## 2024-05-23 ENCOUNTER — OFFICE VISIT (OUTPATIENT)
Dept: FAMILY MEDICINE CLINIC | Facility: CLINIC | Age: 59
End: 2024-05-23
Payer: COMMERCIAL

## 2024-05-23 VITALS
HEART RATE: 64 BPM | BODY MASS INDEX: 39.25 KG/M2 | SYSTOLIC BLOOD PRESSURE: 132 MMHG | HEIGHT: 74 IN | TEMPERATURE: 98 F | WEIGHT: 305.8 LBS | OXYGEN SATURATION: 96 % | DIASTOLIC BLOOD PRESSURE: 76 MMHG

## 2024-05-23 DIAGNOSIS — E78.5 HYPERLIPIDEMIA LDL GOAL <70: ICD-10-CM

## 2024-05-23 DIAGNOSIS — R19.5 POSITIVE COLORECTAL CANCER SCREENING USING COLOGUARD TEST: Primary | ICD-10-CM

## 2024-05-23 DIAGNOSIS — Z00.00 WELL ADULT EXAM: Primary | ICD-10-CM

## 2024-05-23 DIAGNOSIS — E66.9 OBESITY (BMI 30-39.9): ICD-10-CM

## 2024-05-23 DIAGNOSIS — Z72.0 TOBACCO USE: ICD-10-CM

## 2024-05-23 DIAGNOSIS — K74.00 LIVER FIBROSIS: ICD-10-CM

## 2024-05-23 DIAGNOSIS — I10 ESSENTIAL HYPERTENSION: Chronic | ICD-10-CM

## 2024-05-23 DIAGNOSIS — I48.0 PAROXYSMAL A-FIB: ICD-10-CM

## 2024-05-23 DIAGNOSIS — Z23 NEED FOR VACCINATION AGAINST STREPTOCOCCUS PNEUMONIAE: ICD-10-CM

## 2024-05-23 DIAGNOSIS — E11.65 TYPE 2 DIABETES MELLITUS WITH HYPERGLYCEMIA, WITHOUT LONG-TERM CURRENT USE OF INSULIN: ICD-10-CM

## 2024-05-30 ENCOUNTER — TELEPHONE (OUTPATIENT)
Dept: FAMILY MEDICINE CLINIC | Facility: CLINIC | Age: 59
End: 2024-05-30

## 2024-05-30 NOTE — TELEPHONE ENCOUNTER
ASSESSMENT/PLAN:  1. Anal fissure  54-year-old with pain with bowel movements.  It is consistent with an anal fissure and I do prescribe Analpram.  For completeness sake although history does not support this we will do an HSV culture.  Symptoms are worsening or unresolving, follow-up for further evaluation.  I recommend that she keep her stools soft using fiber or Colace if needed.  - hydrocortisone-pramoxine (ANALPRAM-HC) 2.5-1 % rectal cream; Insert into the rectum 3 (three) times a day.  Dispense: 28.35 g; Refill: 3  - HSV 1 and 2 DNA PCR, Rectum - Misc. Lab Test    2. Vaginitis  She has notable vaginal atrophy and is postmenopausal.  She has previously discussed using Premarin cream with her gynecologist and I also discussed this with her.  She like to start it and I prescribed Premarin vaginal cream 1/2 g daily for 2-4 weeks and then as needed thereafter.  For completeness sake also we do do a urine culture which is negative.  - conjugated estrogens (PREMARIN) vaginal cream; 1/2 gm vaginally daily as needed  Dispense: 42.5 g; Refill: 12  - Urinalysis-UC if Indicated  - Culture, Urine  - HSV 1 and 2 DNA PCR, Rectum - Misc. Lab Test    General Health Maintenance  Her last DXA was completed on 12/21/17 and revealed osteoporosis, high fracture risk, and a low t-score of -2.5 in L1-L4. She is to have a follow up DXA every 1-2 years from the date it was completed.  Her last mammogram was completed on 10/9/17 and revealed no radiographic evidence of malignancy. She is to continue routine screening mammogram as recommended annually.        There are no Patient Instructions on file for this visit.    Orders Placed This Encounter   Procedures     Culture, Urine     Urinalysis-UC if Indicated     HSV 1 and 2 DNA PCR, Rectum - Misc. Lab Test     Order Specific Question:   What is the name of the test you wish to perform?     Answer:   HSV 1 and 2 DNA PCR, Rectum     There are no discontinued medications.    No Follow-up  Caller: JEFFY BYRNE    Relationship: [facility] FOR [facility], [place], operated by [facility] call back number: [phone]   USE AUTH NUMBER: [id number]    What orders are you requesting: CT OF THORACIC AND NEEDS THE LAST VISIT NOTES    In what timeframe would the patient need to come in: BEFORE 06/01/24    Additional notes: PLEASE FAX LAST VISIT NOTE TO : [phone]     "on file.    CHIEF COMPLAINT;  Chief Complaint   Patient presents with     Concerns     lump left outside of rectum x 1 week       HISTORY OF PRESENT ILLNESS:  Katina is a 54 y.o. female presenting to the clinic today with concerns of a lump outside of her rectum.    Anal Fissure: She states that a week ago she felt a burning sensation on her rectum while having a bowel movement. She states that she found a small lump that is sensitive to the touch. She localizes her discomfort to the left side of her rectum. She thought it was a hemorrhoid, but mentions that there was no bleeding. She mentions that she bought Tucks and Preparation H that have not relieved her symptoms. She states that she has recently been taking Tums, which have made her stools hard.    Vaginitis: She endorses vaginal discharge with an odor. She is post-menopausal. She saw Dr. Rendon and a Pap smear was done. She was told that the Pap smear did not obtain a good sample. She was told she could try an estrogen cream for a month and return to the clinic for a repeat Pap smear.    Osteoporosis: She had a DXA completed on 12/21/17 that revealed osteoporosis and high fracture risk. She started taking a calcium supplement.     REVIEW OF SYSTEMS:  She would like her ears looked at. She states that her children tell her that she is not hearing as much, especially since she has been turning the volume up louder on her television. All other systems are negative.    PFSH:  She is busy with work. She is still working at [employer]. Reviewed as below.    TOBACCO USE:  History   Smoking Status     Never Smoker   Smokeless Tobacco     Never Used       VITALS:  Vitals:    01/25/18 1612   BP: (!) 88/56   Pulse: (!) 46   Weight: 105 lb 6.4 oz (47.8 kg)   Height: 5' 2\" (1.575 m)     Wt Readings from Last 3 Encounters:   01/25/18 105 lb 6.4 oz (47.8 kg)   12/29/17 101 lb (45.8 kg)   11/20/17 101 lb (45.8 kg)     Body mass index is 19.28 kg/(m^2).    PHYSICAL " EXAM:  GENERAL APPEARANCE: Alert, cooperative, no distress, appears stated age  HEAD: Normocephalic, without obvious abnormality, atraumatic       EARS: Normal TM's and external ear canals, both ears  RECTUM: Fissure along left wall and some skin irritation and possible ulceration  PELVIC: Normally developed genitalia with no external lesions or eruptions. Vagina and cervix show no lesions. No cystocele, No rectocele. Uterus normal.  No adnexal mass or tenderness. Vaginal atrophy.  SKIN: Skin color, texture, turgor normal, no rashes or lesions  LYMPH NODES: Cervical, supraclavicular, and axillary nodes normal  NEUROLOGIC: CNII-XII intact.    RECENT RESULTS  No results found for this or any previous visit (from the past 48 hour(s)).    ADDITIONAL HISTORY SUMMARIZED (2): None.  DECISION TO OBTAIN EXTRA INFORMATION (1): None.  RADIOLOGY TESTS (1): None.  LABS (1): Labs ordered.  MEDICINE TESTS (1): None.  INDEPENDENT REVIEW (2 each): None.    The visit lasted a total of 14 minutes face to face with the patient. Over 50% of the time was spent counseling and educating the patient about vaginitis and anal fissure.    IDotty, am scribing for and in the presence of, Dr. Cervantes.    I, Dr. Cervantes, personally performed the services described in this documentation, as scribed by Dotty Manjarrez in my presence, and it is both accurate and complete.    MEDICATIONS:  Current Outpatient Prescriptions   Medication Sig Dispense Refill     ascorbic acid-vitamin E-biotin (HAIR, SKIN, NAILS WITH BIOTIN) 7.5-7.5-1,250 mg-unit-mcg Chew Chew 2 capsules daily.       aspirin 325 MG EC tablet Take 1 tablet (325 mg total) by mouth daily.  0     cholecalciferol, vitamin D3, 1,000 unit Chew Chew 2,000 Units daily.       cyanocobalamin, vitamin B-12, 1,000 mcg Subl Place 1,000 mcg under the tongue daily.       multivit with min-folic acid 200 mcg Chew Chew 2 capsules daily.       naproxen-diphenhydramine (ALEVE PM) 220-25 mg Tab Take  1 tablet by mouth daily as needed.       atorvastatin (LIPITOR) 10 MG tablet Take 1 tablet (10 mg total) by mouth daily. 30 tablet 11     conjugated estrogens (PREMARIN) vaginal cream 1/2 gm vaginally daily as needed 42.5 g 12     hydrocortisone-pramoxine (ANALPRAM-HC) 2.5-1 % rectal cream Insert into the rectum 3 (three) times a day. 28.35 g 3     No current facility-administered medications for this visit.        Total data points: 1

## 2024-07-18 ENCOUNTER — TELEPHONE (OUTPATIENT)
Dept: FAMILY MEDICINE CLINIC | Facility: CLINIC | Age: 59
End: 2024-07-18
Payer: COMMERCIAL

## 2024-07-18 NOTE — TELEPHONE ENCOUNTER
Caller: John Wolfe    Relationship: Self    Best call back number: [phone]      Any additional details:     PATIENT WOULD LIKE TO GO AHEAD WITH REFERRAL

## 2024-07-18 NOTE — TELEPHONE ENCOUNTER
Relay     LVM- Detailed message left for patient, following up on current referral status. Request to have patient call pcp office with update. Would patient like to continue to complete referral or have it close?

## 2024-07-22 NOTE — PROGRESS NOTES
Office Note     Name: John Wolfe    : 1965     MRN: [id number]     Chief Complaint  Follow-up (2 month )    History of Present Illness:  John Wolfe is a 59 y.o. male who presents today for 2 month follow up of diabetes.  He does not know what his blood sugar runs at home, reports he does not check it very often.  He does report that he is trying to adhere to a lower carbohydrate diet.  He reports walking on the treadmill and doing the exercise bike regularly.  He reports that he needs to schedule optometry appointment for his yearly diabetic eye exam.  He denies any family history of thyroid cancer or multiple endocrine neoplasia.  He denies any history of pancreatitis.    He did have recent FibroSure test that suggested fatty liver disease and possible liver fibrosis.  He has upcoming appointment to see gastroenterology next month.  He also has upcoming appointment next week with Dr. Joseph for colonoscopy, per patient.    He reports he is doing well overall.  He reports that his leg swelling has improved.  He denies any chest pain, palpitations, dizziness, shortness of breath, lightheadedness, etc.  He reports he is doing well on all of his medications and denies side effects.  He denies needing any refills at present time.  Reports that his cardiologist prescribes most of these medications.    Subjective     Review of Systems:   Review of Systems   Constitutional:  Negative for fatigue.   Respiratory:  Negative for shortness of breath and wheezing.    Cardiovascular:  Negative for chest pain, palpitations and leg swelling.   Gastrointestinal:  Negative for abdominal pain.   Neurological:  Negative for dizziness, syncope and light-headedness.       I have reviewed the patients family history, social history, past medical history, past surgical history and have updated it as appropriate.     Past Medical History:   Past Medical History:   Diagnosis Date    Aortic valve stenosis      Diabetes mellitus     GERD (gastroesophageal reflux disease)     Heart murmur     Hyperlipidemia     Hypertension     Stroke        Past Surgical History:   Past Surgical History:   Procedure Laterality Date    AORTIC VALVE REPAIR/REPLACEMENT N/A 2/23/2023    Procedure: TRANSCATHETER AORTIC VALVE REPLACEMENT,TEODORO;  Surgeon: Clemente Barger MD;  Location: [facility];  Service: Cardiothoracic;  Laterality: N/A;  FLOURO  DOSE  CONTRAST    AORTIC VALVE REPAIR/REPLACEMENT N/A 2/23/2023    Procedure: Transfemoral Transcatheter Aortic Valve Replacement;  Surgeon: Edwin Guan MD;  Location: [facility];  Service: Cardiovascular;  Laterality: N/A;    CARDIAC CATHETERIZATION N/A 01/26/2023    Procedure: Left Heart Cath;  Surgeon: Edwin Guan MD;  Location: [facility] CATH INVASIVE LOCATION;  Service: Cardiology;  Laterality: N/A;    CHOLECYSTECTOMY      NASAL SEPTAL RECONSTRUCTION         Family History:   Family History   Problem Relation Age of Onset    Hypertension Mother     Hyperlipidemia Mother     Esophageal cancer Mother     Stroke Father     Hypertension Father     Hyperlipidemia Father        Social History:   Social History     Socioeconomic History    Marital status: Significant Other    Number of children: 1   Tobacco Use    Smoking status: Every Day     Current packs/day: 1.00     Average packs/day: 1 pack/day for 35.0 years (35.0 ttl pk-yrs)     Types: Cigarettes     Passive exposure: Current    Smokeless tobacco: Current     Types: Snuff   Vaping Use    Vaping status: Some Days    Substances: Nicotine, Flavoring    Devices: Refillable tank    Passive vaping exposure: Yes   Substance and Sexual Activity    Alcohol use: Yes     Comment: rarely    Drug use: Never    Sexual activity: Yes     Partners: Female       Immunizations:   Immunization History   Administered Date(s) Administered    Influenza Seasonal Injectable 10/01/2019    Pneumococcal Conjugate 20-Valent (PCV20) 05/23/2024  "       Medications:     Current Outpatient Medications:     aspirin 81 MG chewable tablet, Chew 1 tablet Daily., Disp: 30 tablet, Rfl: 0    atorvastatin (LIPITOR) 80 MG tablet, Take 1 tablet by mouth Every Night., Disp: , Rfl:     bisoprolol (ZEBeta) 5 MG tablet, Take 0.5 tablets by mouth Every Night., Disp: , Rfl:     cetirizine (zyrTEC) 10 MG tablet, Take 1 tablet by mouth Daily., Disp: , Rfl:     ezetimibe (Zetia) 10 MG tablet, Take 1 tablet by mouth Daily., Disp: 30 tablet, Rfl: 11    furosemide (LASIX) 40 MG tablet, Take 1 tablet by mouth 2 (Two) Times a Day., Disp: , Rfl:     metoprolol succinate XL (TOPROL-XL) 50 MG 24 hr tablet, Take 1 tablet by mouth Daily., Disp: , Rfl:     rivaroxaban (Xarelto) 20 MG tablet, Take 1 tablet by mouth Daily With Dinner., Disp: 90 tablet, Rfl: 3    valsartan (DIOVAN) 160 MG tablet, Take 1 tablet by mouth Daily., Disp: , Rfl:     Ventolin  (90 Base) MCG/ACT inhaler, , Disp: , Rfl:     Tirzepatide (MOUNJARO) 2.5 MG/0.5ML solution pen-injector pen, Inject 0.5 mL under the skin into the appropriate area as directed 1 (One) Time Per Week., Disp: 2 mL, Rfl: 1  No current facility-administered medications for this visit.    Facility-Administered Medications Ordered in Other Visits:     Chlorhexidine Gluconate Cloth 2 % pads 1 application, 1 application , Topical, Q12H PRN, WillMaura, KYLAH    Allergies:   Allergies   Allergen Reactions    Lidocaine Hcl-Sodium Chloride Hives    Ceclor [Cefaclor] Rash       Objective     Vital Signs  Vitals:    07/23/24 1035 07/23/24 1115   BP: 138/88    BP Location: Left arm    Patient Position: Sitting    Cuff Size: Large Adult    Pulse: 102 85   Resp: 18    Temp: 98 °F (36.7 °C)    TempSrc: Temporal    SpO2: 100% 96%   Weight: (!) 137 kg (302 lb 9.6 oz)    Height: 188 cm (74.02\")    PainSc: 0-No pain      Estimated body mass index is 38.83 kg/m² as calculated from the following:    Height as of this encounter: 188 cm (74.02\").    " Weight as of this encounter: 137 kg (302 lb 9.6 oz).            Physical Exam  Vitals and nursing note reviewed.   Constitutional:       General: He is not in acute distress.     Appearance: Normal appearance. He is not ill-appearing, toxic-appearing or diaphoretic.   HENT:      Head: Normocephalic and atraumatic.   Eyes:      Extraocular Movements: Extraocular movements intact.   Neck:      Comments: No thyromegaly or nodules appreciated or palpated  Cardiovascular:      Rate and Rhythm: Normal rate and regular rhythm.      Heart sounds: No murmur heard.     No friction rub. No gallop.   Pulmonary:      Effort: Pulmonary effort is normal. No respiratory distress.      Breath sounds: No wheezing, rhonchi or rales.   Musculoskeletal:      Cervical back: Normal range of motion.      Right lower leg: No edema.      Left lower leg: No edema.   Skin:     Coloration: Skin is not pale.   Neurological:      Mental Status: He is alert and oriented to person, place, and time. Mental status is at baseline.   Psychiatric:         Mood and Affect: Mood normal.         Thought Content: Thought content normal.          Assessment and Plan        Encounter Diagnoses   Name Primary?    Liver fibrosis Yes    Essential hypertension     Paroxysmal A-fib     Type 2 diabetes mellitus with hyperglycemia, without long-term current use of insulin     Immunization counseling        Liver fibrosis  -He has upcoming appointment to see gastroenterology.  Stressed the importance of dietary and lifestyle changes.  Advised to adhere to a low carbohydrate and low-fat diet.  Advised 300 minutes of aerobic activity weekly.  Stressed the importance of weight loss for control of fatty liver disease and prevention of worsening liver fibrosis.    2.  Essential hypertension  -Blood pressure is well-controlled in the office today.  Patient denies any side effects and denies needing refills.  Will defer definitive treatment to his cardiologist.    3.   Paroxysmal A-fib  -Patient is in normal sinus rhythm at present time.  He is tolerating blood thinner well and taking faithfully.  Defer definitive management to cardiology.    4.  Type 2 diabetes mellitus with hyperglycemia, without long-term current use of insulin  -We discussed GLP-1 agonists at length.  I have described the pathophysiology behind the medication, described the proper way to administer the medication, dosing schedule, and potential side effects.  -A contraindication to this medication is a personal or family history of thyroid cancer or multiple endocrine neoplasia.  The patient denies this history.  -We did discuss the importance of cleaning the skin with an alcohol prep pad prior to giving themselves the injection and injecting the medication at least 2 finger widths away from the umbilicus.  -Discussed possible side effects, including most common side effect of nausea and vomiting.  If they have any intolerance to the medication, I encouraged them to call or return to care.  -We will follow-up in 1 month to ensure they are tolerating medicine well and to discuss dose escalation.  -The patient verbalizes understanding and has no further questions.  -Encouraged that he schedule appointment for yearly eye exam.  -Repeat HA1c has been ordered today.    5. Immunization counseling  -Patient does need both Tdap and shingles vaccination.  Encouraged adherence to vaccinations, risk, benefits, and possible side effects.      Follow Up  Return in about 1 month (around 8/23/2024) for Recheck.    ORLANDO Dill

## 2024-07-23 ENCOUNTER — OFFICE VISIT (OUTPATIENT)
Dept: FAMILY MEDICINE CLINIC | Facility: CLINIC | Age: 59
End: 2024-07-23
Payer: COMMERCIAL

## 2024-07-23 ENCOUNTER — LAB (OUTPATIENT)
Dept: FAMILY MEDICINE CLINIC | Facility: CLINIC | Age: 59
End: 2024-07-23
Payer: COMMERCIAL

## 2024-07-23 VITALS
OXYGEN SATURATION: 96 % | TEMPERATURE: 98 F | DIASTOLIC BLOOD PRESSURE: 88 MMHG | WEIGHT: 302.6 LBS | HEIGHT: 74 IN | RESPIRATION RATE: 18 BRPM | HEART RATE: 85 BPM | BODY MASS INDEX: 38.84 KG/M2 | SYSTOLIC BLOOD PRESSURE: 138 MMHG

## 2024-07-23 DIAGNOSIS — I10 ESSENTIAL HYPERTENSION: ICD-10-CM

## 2024-07-23 DIAGNOSIS — Z71.85 IMMUNIZATION COUNSELING: ICD-10-CM

## 2024-07-23 DIAGNOSIS — E11.65 TYPE 2 DIABETES MELLITUS WITH HYPERGLYCEMIA, WITHOUT LONG-TERM CURRENT USE OF INSULIN: ICD-10-CM

## 2024-07-23 DIAGNOSIS — I48.0 PAROXYSMAL A-FIB: ICD-10-CM

## 2024-07-23 DIAGNOSIS — K74.00 LIVER FIBROSIS: Primary | ICD-10-CM

## 2024-07-23 DIAGNOSIS — K74.00 LIVER FIBROSIS: ICD-10-CM

## 2024-07-23 LAB
ALBUMIN SERPL-MCNC: 4.1 G/DL (ref 3.5–5.2)
ALBUMIN/GLOB SERPL: 1.4 G/DL
ALP SERPL-CCNC: 80 U/L (ref 39–117)
ALT SERPL W P-5'-P-CCNC: 66 U/L (ref 1–41)
ANION GAP SERPL CALCULATED.3IONS-SCNC: 10 MMOL/L (ref 5–15)
AST SERPL-CCNC: 52 U/L (ref 1–40)
BILIRUB SERPL-MCNC: 0.3 MG/DL (ref 0–1.2)
BUN SERPL-MCNC: 11 MG/DL (ref 8–23)
BUN/CREAT SERPL: 11.2 (ref 7–25)
CALCIUM SPEC-SCNC: 9.1 MG/DL (ref 8.6–10.5)
CHLORIDE SERPL-SCNC: 103 MMOL/L (ref 98–107)
CO2 SERPL-SCNC: 25 MMOL/L (ref 22–29)
CREAT SERPL-MCNC: 0.98 MG/DL (ref 0.76–1.27)
EGFRCR SERPLBLD CKD-EPI 2021: 88.3 ML/MIN/1.73
GLOBULIN UR ELPH-MCNC: 3 GM/DL
GLUCOSE SERPL-MCNC: 143 MG/DL (ref 65–99)
POTASSIUM SERPL-SCNC: 3.9 MMOL/L (ref 3.5–5.2)
PROT SERPL-MCNC: 7.1 G/DL (ref 6–8.5)
SODIUM SERPL-SCNC: 138 MMOL/L (ref 136–145)

## 2024-07-23 PROCEDURE — 99214 OFFICE O/P EST MOD 30 MIN: CPT

## 2024-07-23 PROCEDURE — 36415 COLL VENOUS BLD VENIPUNCTURE: CPT | Performed by: FAMILY MEDICINE

## 2024-07-23 PROCEDURE — 83036 HEMOGLOBIN GLYCOSYLATED A1C: CPT

## 2024-07-23 PROCEDURE — 80053 COMPREHEN METABOLIC PANEL: CPT

## 2024-07-24 ENCOUNTER — TELEPHONE (OUTPATIENT)
Dept: FAMILY MEDICINE CLINIC | Facility: CLINIC | Age: 59
End: 2024-07-24
Payer: COMMERCIAL

## 2024-07-24 LAB — HBA1C MFR BLD: 6.5 % (ref 4.8–5.6)

## 2024-07-24 NOTE — TELEPHONE ENCOUNTER
----- Message from Miriam Hayes sent at 7/24/2024  7:58 AM EDT -----  Hemoglobin A1c has improved from 3 months ago, but is still diabetic.  I do still believe Mounjaro would be a good treatment for both his diabetes and hopefully promote weight loss.  Please continue with low carbohydrate diet and increasing physical activity.  Liver enzymes do continue to be elevated but are lower than they were several months ago.  Please continue with low carbohydrate diet and avoid alcohol and Tylenol.

## 2024-07-24 NOTE — PROGRESS NOTES
Attempted to call patient with results. Left a detailed voicemail with patients results. Instructed patient to call us if they have any questions or concerns. Telephone encounter created

## 2024-07-24 NOTE — TELEPHONE ENCOUNTER
Detailed message left for patient by phone, requesting to have patient call pcp office following up on current referral for Podiatry. Would you like for this referral to continued to be active or close referral.    Relay

## 2024-07-24 NOTE — TELEPHONE ENCOUNTER
Attempted to call patient with results. Left a detailed voicemail with patients results. Instructed patient to call us if they have any questions or concerns. Relay

## 2024-07-25 ENCOUNTER — TELEPHONE (OUTPATIENT)
Dept: FAMILY MEDICINE CLINIC | Facility: CLINIC | Age: 59
End: 2024-07-25

## 2024-07-25 DIAGNOSIS — R91.1 NODULE OF LEFT LUNG: Primary | ICD-10-CM

## 2024-07-25 RX ORDER — LORATADINE 10 MG/1
10 TABLET ORAL DAILY
Qty: 30 TABLET | Refills: 5 | Status: SHIPPED | OUTPATIENT
Start: 2024-07-25

## 2024-07-25 NOTE — TELEPHONE ENCOUNTER
Caller: John Wolfe    Relationship: Self    Best call back number: [phone]    What medication are you requesting:     CLARITIN - D    What are your current symptoms:     SINUS DRAINAGE  COUGH WHEN LAYING DOWN    How long have you been experiencing symptoms:  4-5 MONTHS    If a prescription is needed, what is your preferred pharmacy and phone number:      [facility] [address] [phone] [facility] [phone]      Additional notes:    ZYRTEC IS NOT WORKING

## 2024-07-31 DIAGNOSIS — R91.1 NODULE OF LEFT LUNG: Primary | ICD-10-CM

## 2024-08-23 ENCOUNTER — OFFICE VISIT (OUTPATIENT)
Dept: FAMILY MEDICINE CLINIC | Facility: CLINIC | Age: 59
End: 2024-08-23
Payer: COMMERCIAL

## 2024-08-23 VITALS
OXYGEN SATURATION: 96 % | RESPIRATION RATE: 18 BRPM | BODY MASS INDEX: 38.27 KG/M2 | HEIGHT: 74 IN | HEART RATE: 72 BPM | DIASTOLIC BLOOD PRESSURE: 92 MMHG | WEIGHT: 298.2 LBS | TEMPERATURE: 98.4 F | SYSTOLIC BLOOD PRESSURE: 142 MMHG

## 2024-08-23 DIAGNOSIS — E11.65 TYPE 2 DIABETES MELLITUS WITH HYPERGLYCEMIA, WITHOUT LONG-TERM CURRENT USE OF INSULIN: ICD-10-CM

## 2024-08-23 DIAGNOSIS — I10 ESSENTIAL HYPERTENSION: ICD-10-CM

## 2024-08-23 DIAGNOSIS — Z23 NEED FOR TDAP VACCINATION: Primary | ICD-10-CM

## 2024-08-23 DIAGNOSIS — Z23 NEED FOR SHINGLES VACCINE: ICD-10-CM

## 2024-08-23 DIAGNOSIS — K74.00 LIVER FIBROSIS: ICD-10-CM

## 2024-08-23 PROCEDURE — 99213 OFFICE O/P EST LOW 20 MIN: CPT

## 2024-08-23 NOTE — PROGRESS NOTES
Office Note     Name: John Wolfe    : 1965     MRN: [id number]     Chief Complaint  Follow-up (1 month), liver fibrosis, Hypertension, and Diabetes    History of Present Illness:  John Wolfe is a 59 y.o. male who presents today for FU after being prescribed Mounjaro.  Unfortunately, the patient reports he was not able to get this medication because it was out of stock.  He does report that he has been making dietary and physical activity changes.  He is walking around 3 miles per day on the treadmill.  He reports he is tolerating all of his chronic medications well.  He denies any cardiovascular, respiratory, GI, or neurologic symptoms.  His next appointment with gastroenterology is next week.        Subjective     Review of Systems:   Review of Systems   Constitutional:  Negative for chills, fatigue and fever.   Eyes:  Negative for blurred vision and double vision.   Respiratory:  Negative for shortness of breath and wheezing.    Cardiovascular:  Negative for chest pain, palpitations and leg swelling.   Gastrointestinal:  Negative for abdominal pain, blood in stool, constipation, diarrhea, nausea and vomiting.   Neurological:  Negative for dizziness, syncope, light-headedness and headache.       I have reviewed the patients family history, social history, past medical history, past surgical history and have updated it as appropriate.     Past Medical History:   Past Medical History:   Diagnosis Date    Aortic valve stenosis     Diabetes mellitus     GERD (gastroesophageal reflux disease)     Heart murmur     Hyperlipidemia     Hypertension     Stroke        Past Surgical History:   Past Surgical History:   Procedure Laterality Date    AORTIC VALVE REPAIR/REPLACEMENT N/A 2023    Procedure: TRANSCATHETER AORTIC VALVE REPLACEMENT,TEODORO;  Surgeon: Clemente Barger MD;  Location: [facility];  Service: Cardiothoracic;  Laterality: N/A;  FLOURO  DOSE  CONTRAST    AORTIC VALVE  REPAIR/REPLACEMENT N/A 2/23/2023    Procedure: Transfemoral Transcatheter Aortic Valve Replacement;  Surgeon: Edwin Guan MD;  Location:  [facility];  Service: Cardiovascular;  Laterality: N/A;    CARDIAC CATHETERIZATION N/A 01/26/2023    Procedure: Left Heart Cath;  Surgeon: Edwin Guan MD;  Location:  [facility] CATH INVASIVE LOCATION;  Service: Cardiology;  Laterality: N/A;    CHOLECYSTECTOMY      NASAL SEPTAL RECONSTRUCTION         Family History:   Family History   Problem Relation Age of Onset    Hypertension Mother     Hyperlipidemia Mother     Esophageal cancer Mother     Stroke Father     Hypertension Father     Hyperlipidemia Father        Social History:   Social History     Socioeconomic History    Marital status: Significant Other    Number of children: 1   Tobacco Use    Smoking status: Every Day     Current packs/day: 1.00     Average packs/day: 1 pack/day for 35.0 years (35.0 ttl pk-yrs)     Types: Cigarettes     Passive exposure: Current    Smokeless tobacco: Current     Types: Snuff   Vaping Use    Vaping status: Some Days    Substances: Nicotine, Flavoring    Devices: Refillable tank    Passive vaping exposure: Yes   Substance and Sexual Activity    Alcohol use: Yes     Comment: rarely    Drug use: Never    Sexual activity: Yes     Partners: Female       Immunizations:   Immunization History   Administered Date(s) Administered    Influenza Seasonal Injectable 10/01/2019    Pneumococcal Conjugate 20-Valent (PCV20) 05/23/2024        Medications:     Current Outpatient Medications:     aspirin 81 MG chewable tablet, Chew 1 tablet Daily., Disp: 30 tablet, Rfl: 0    atorvastatin (LIPITOR) 80 MG tablet, Take 1 tablet by mouth Every Night., Disp: , Rfl:     bisoprolol (ZEBeta) 5 MG tablet, Take 0.5 tablets by mouth Every Night., Disp: , Rfl:     ezetimibe (Zetia) 10 MG tablet, Take 1 tablet by mouth Daily., Disp: 30 tablet, Rfl: 11    furosemide (LASIX) 40 MG tablet, Take 1 tablet by  "mouth 2 (Two) Times a Day., Disp: , Rfl:     loratadine (Claritin) 10 MG tablet, Take 1 tablet by mouth Daily., Disp: 30 tablet, Rfl: 5    metoprolol succinate XL (TOPROL-XL) 50 MG 24 hr tablet, Take 1 tablet by mouth Daily., Disp: , Rfl:     rivaroxaban (Xarelto) 20 MG tablet, Take 1 tablet by mouth Daily With Dinner., Disp: 90 tablet, Rfl: 3    Tirzepatide (MOUNJARO) 2.5 MG/0.5ML solution pen-injector pen, Inject 0.5 mL under the skin into the appropriate area as directed 1 (One) Time Per Week., Disp: 2 mL, Rfl: 1    valsartan (DIOVAN) 160 MG tablet, Take 1 tablet by mouth Daily., Disp: , Rfl:     Ventolin  (90 Base) MCG/ACT inhaler, , Disp: , Rfl:   No current facility-administered medications for this visit.    Facility-Administered Medications Ordered in Other Visits:     Chlorhexidine Gluconate Cloth 2 % pads 1 application, 1 application , Topical, Q12H PRN, Maura Solis APRN    Allergies:   Allergies   Allergen Reactions    Ceclor [Cefaclor] Rash    Lidocaine Hcl-Sodium Chloride Hives       Objective     Vital Signs  Vitals:    08/23/24 0817 08/23/24 0821 08/23/24 0856   BP: 170/60 164/98 142/92   BP Location: Left arm Right arm Left arm   Patient Position: Sitting Sitting Sitting   Cuff Size: Large Adult Large Adult Large Adult   Pulse: 102  72   Resp: 18     Temp: 98.4 °F (36.9 °C)     TempSrc: Temporal     SpO2: 98%  96%   Weight: 135 kg (298 lb 3.2 oz)     Height: 188 cm (74\")       Estimated body mass index is 38.29 kg/m² as calculated from the following:    Height as of this encounter: 188 cm (74\").    Weight as of this encounter: 135 kg (298 lb 3.2 oz).            Physical Exam  Vitals and nursing note reviewed.   Constitutional:       General: He is not in acute distress.     Appearance: Normal appearance. He is not ill-appearing, toxic-appearing or diaphoretic.   HENT:      Head: Normocephalic and atraumatic.   Eyes:      Extraocular Movements: Extraocular movements intact. "   Cardiovascular:      Rate and Rhythm: Normal rate and regular rhythm.      Heart sounds: No murmur heard.     No friction rub. No gallop.   Pulmonary:      Effort: Pulmonary effort is normal. No respiratory distress.      Breath sounds: No wheezing, rhonchi or rales.   Musculoskeletal:      Cervical back: Normal range of motion.   Skin:     Coloration: Skin is not pale.   Neurological:      Mental Status: He is alert and oriented to person, place, and time. Mental status is at baseline.   Psychiatric:         Mood and Affect: Mood normal.         Thought Content: Thought content normal.          Assessment and Plan     1. Need for Tdap vaccination  -Did remind patient he is due for Tdap vaccination.  Discussed purpose of vaccine, risk, benefits, and possible side effects.  He does not wish to have Tdap vaccination today and accepts all risks.    2. Need for shingles vaccine  -Discussed with patient need for shingles vaccination.  Discussed purpose of vaccine, risk, benefits, and side effects.  We do not carry shingles vaccination in the office but he has been encouraged to inquire about this at local pharmacy.    3. Type 2 diabetes mellitus with hyperglycemia, without long-term current use of insulin  -Last hemoglobin A1c was to goal.  Unfortunately, Mounjaro has not been in stock at his pharmacy so he has not been able to start this medication.  He has been advised to make a 1 month follow-up if medication becomes available at the pharmacy.  Otherwise, we would reassess patient in 3 months.  He has been advised to schedule appointment for diabetic eye exam.  No changes will be made at present time.    4. Liver fibrosis  -He does have appointment with gastroenterology soon for liver fibrosis diagnosis.  He has been advised to make dietary changes, reduce carbohydrate intake and fried and fatty food intake and increase physical activity, ideally 300 minutes of aerobic activity weekly.  We have discussed that weight  loss would also be beneficial.    5. Essential hypertension  -Blood pressure is elevated in the office today but he reports that he has not yet taken his medication.  He has been advised to take blood pressure medication when he goes home.       Follow Up  Return in about 3 months (around 11/23/2024) for Recheck.    Darleen Hayes PA-C  [facility]

## 2024-09-12 ENCOUNTER — PATIENT MESSAGE (OUTPATIENT)
Dept: FAMILY MEDICINE CLINIC | Facility: CLINIC | Age: 59
End: 2024-09-12
Payer: COMMERCIAL

## 2024-09-16 ENCOUNTER — TELEPHONE (OUTPATIENT)
Dept: FAMILY MEDICINE CLINIC | Facility: CLINIC | Age: 59
End: 2024-09-16
Payer: COMMERCIAL

## 2024-09-17 ENCOUNTER — TELEPHONE (OUTPATIENT)
Dept: FAMILY MEDICINE CLINIC | Facility: CLINIC | Age: 59
End: 2024-09-17
Payer: COMMERCIAL

## 2024-09-26 ENCOUNTER — TELEPHONE (OUTPATIENT)
Dept: FAMILY MEDICINE CLINIC | Facility: CLINIC | Age: 59
End: 2024-09-26
Payer: COMMERCIAL

## 2024-09-26 NOTE — TELEPHONE ENCOUNTER
Relay     Message left for patient, following up on current referral appointment status. Request to have patient call pcp office to give update on referral status. Would patient like to complete referral to see Dr Mert Joseph, or have current referral to be closed.

## 2024-11-20 RX ORDER — ATORVASTATIN CALCIUM 80 MG/1
80 TABLET, FILM COATED ORAL NIGHTLY
Qty: 90 TABLET | Refills: 0 | Status: SHIPPED | OUTPATIENT
Start: 2024-11-20

## 2024-12-12 RX ORDER — EZETIMIBE 10 MG/1
10 TABLET ORAL DAILY
Qty: 30 TABLET | Refills: 1 | Status: SHIPPED | OUTPATIENT
Start: 2024-12-12

## 2024-12-12 NOTE — TELEPHONE ENCOUNTER
Rx Refill Note  Requested Prescriptions     Pending Prescriptions Disp Refills    ezetimibe (Zetia) 10 MG tablet 30 tablet 1     Sig: Take 1 tablet by mouth Daily.      Last office visit with prescribing clinician: 2/23/24  Last telemedicine visit with prescribing clinician: Visit date not found   Next office visit with prescribing clinician: 2/28/25  Garima Figueroa MA  12/12/24, 10:34 EST

## 2024-12-26 ENCOUNTER — OFFICE VISIT (OUTPATIENT)
Dept: FAMILY MEDICINE CLINIC | Facility: CLINIC | Age: 59
End: 2024-12-26
Payer: COMMERCIAL

## 2024-12-26 VITALS
BODY MASS INDEX: 45.1 KG/M2 | SYSTOLIC BLOOD PRESSURE: 172 MMHG | HEIGHT: 70 IN | HEART RATE: 90 BPM | WEIGHT: 315 LBS | DIASTOLIC BLOOD PRESSURE: 102 MMHG | OXYGEN SATURATION: 96 % | TEMPERATURE: 98.4 F

## 2024-12-26 DIAGNOSIS — K76.0 FATTY LIVER: Primary | ICD-10-CM

## 2024-12-26 DIAGNOSIS — Z12.11 COLON CANCER SCREENING: ICD-10-CM

## 2024-12-26 DIAGNOSIS — E11.65 TYPE 2 DIABETES MELLITUS WITH HYPERGLYCEMIA, WITHOUT LONG-TERM CURRENT USE OF INSULIN: ICD-10-CM

## 2024-12-26 DIAGNOSIS — R05.1 ACUTE COUGH: ICD-10-CM

## 2024-12-26 DIAGNOSIS — E66.01 MORBID (SEVERE) OBESITY DUE TO EXCESS CALORIES: ICD-10-CM

## 2024-12-26 DIAGNOSIS — I10 PRIMARY HYPERTENSION: ICD-10-CM

## 2024-12-26 DIAGNOSIS — J34.89 SINUS DRAINAGE: ICD-10-CM

## 2024-12-26 DIAGNOSIS — R91.1 LUNG NODULE SEEN ON IMAGING STUDY: ICD-10-CM

## 2024-12-26 LAB
EXPIRATION DATE: ABNORMAL
HBA1C MFR BLD: 6.9 % (ref 4.5–5.7)
Lab: ABNORMAL

## 2024-12-26 RX ORDER — BENZONATATE 100 MG/1
100 CAPSULE ORAL 3 TIMES DAILY PRN
Qty: 30 CAPSULE | Refills: 0 | Status: SHIPPED | OUTPATIENT
Start: 2024-12-26

## 2024-12-26 RX ORDER — FLUTICASONE PROPIONATE 50 MCG
2 SPRAY, SUSPENSION (ML) NASAL DAILY
Qty: 11.1 G | Refills: 2 | Status: SHIPPED | OUTPATIENT
Start: 2024-12-26

## 2024-12-26 RX ORDER — VALSARTAN 320 MG/1
320 TABLET ORAL DAILY
Qty: 90 TABLET | Refills: 1 | Status: SHIPPED | OUTPATIENT
Start: 2024-12-26

## 2024-12-26 RX ORDER — SEMAGLUTIDE 0.25 MG/.5ML
INJECTION, SOLUTION SUBCUTANEOUS
Qty: 46.8 ML | Refills: 0 | Status: SHIPPED | OUTPATIENT
Start: 2024-12-26 | End: 2024-12-30 | Stop reason: SDUPTHER

## 2024-12-26 NOTE — PROGRESS NOTES
Office Note     Name: John Wolfe    : 1965     MRN: [id number]     Chief Complaint  Annual Exam (Hypertensin, DM, elevated cjolesterol)    Subjective     History of Present Illness:  John Wolfe is a 59 y.o. male who presents today for a routine examination.   History of Present Illness  The patient is a 59-year-old male who presents for evaluation of hypertension, fatty liver, type 2 diabetes, cough, and weight management.    He has a history of essential hypertension. He reports no palpitations and does not consume alcohol. He used to monitor his blood pressure at home, but he has not done so recently. His usual blood pressure is 150/80. He takes valsartan, and metoprolol daily around 12:00 PM, adhering to this schedule without missing doses.    He has a history of fatty liver disease, which was previously managed with weight loss. He was informed that his liver enzymes were elevated, but subsequent tests showed normalization.    He has a history of type 2 diabetes. He reports consuming sweets during the holiday season. He has been advised to try Ozempic for weight loss, but it was not approved by his insurance.    He has a persistent cough, particularly when lying down, which he attributes to postnasal drip. He has been managing this with Claritin, which provides some relief. He has been sleeping in an elevated position to alleviate the cough. He reports no recent exposure to new allergens.    He has a history of weight gain, reaching over 300 pounds, and has been advised to lose weight. He has been denied coverage for Ozempic.    He has a history of aortic stenosis and underwent aortic valve replacement in . He also had a cardiac catheterization last year. He has a history of atrial fibrillation and is on Xarelto, although he occasionally forgets to take it. He reports no issues since the surgery but notes a decrease in physical activity.    He has a history of leg swelling and was  previously on Lasix, but it was ineffective. He has been drinking more Mountain Dew Zero, which he notes has a high sodium content. He plans to increase his water intake.    He has a history of hyperlipidemia.    He has a history of GERD.    He has a history of stroke.    He has a history of tobacco use and currently smokes a pack a day.    He has a history of sinus issues and underwent nasal septum reconstruction for sleep apnea. He reports constant ringing in his left ear since a cold and persistent throat drainage. He drinks a lot of coffee.    He has a history of gallbladder removal.    He has a history of dental issues, including a tooth extraction due to infection, for which he took antibiotics.    SOCIAL HISTORY  The patient admits to smoking a pack a day and has been smoking since he was a teenager. He does not drink alcohol.    FAMILY HISTORY  The patient has a family history of hypertension, hyperlipidemia, and esophageal cancer in his mother; stroke, hypertension, and hyperlipidemia in his father. His son has Crohn's disease.    MEDICATIONS  Current: Bisoprolol, valsartan, metoprolol, Xarelto, Claritin.  Past: Lasix, Toradol, Flexeril.    Review of Systems:   Review of Systems    Past Medical History:   Past Medical History:   Diagnosis Date    Aortic valve stenosis     Diabetes mellitus     GERD (gastroesophageal reflux disease)     Heart murmur     Hyperlipidemia     Hypertension     Stroke        Past Surgical History:   Past Surgical History:   Procedure Laterality Date    AORTIC VALVE REPAIR/REPLACEMENT N/A 2/23/2023    Procedure: TRANSCATHETER AORTIC VALVE REPLACEMENT,TEODORO;  Surgeon: Clemente Barger MD;  Location: [facility];  Service: Cardiothoracic;  Laterality: N/A;  FLOURO  DOSE  CONTRAST    AORTIC VALVE REPAIR/REPLACEMENT N/A 2/23/2023    Procedure: Transfemoral Transcatheter Aortic Valve Replacement;  Surgeon: Edwin Guan MD;  Location: [facility];  Service:  Cardiovascular;  Laterality: N/A;    CARDIAC CATHETERIZATION N/A 01/26/2023    Procedure: Left Heart Cath;  Surgeon: Edwin Guan MD;  Location: [facility] CATH INVASIVE LOCATION;  Service: Cardiology;  Laterality: N/A;    CHOLECYSTECTOMY      NASAL SEPTAL RECONSTRUCTION         Immunizations:   Immunization History   Administered Date(s) Administered    Influenza Seasonal Injectable 10/01/2019    Pneumococcal Conjugate 20-Valent (PCV20) 05/23/2024        Medications:     Current Outpatient Medications:     aspirin 81 MG chewable tablet, Chew 1 tablet Daily., Disp: 30 tablet, Rfl: 0    atorvastatin (LIPITOR) 80 MG tablet, Take 1 tablet by mouth Every Night., Disp: 90 tablet, Rfl: 0    ezetimibe (Zetia) 10 MG tablet, Take 1 tablet by mouth Daily., Disp: 30 tablet, Rfl: 1    metoprolol succinate XL (TOPROL-XL) 50 MG 24 hr tablet, Take 1 tablet by mouth Daily., Disp: , Rfl:     rivaroxaban (Xarelto) 20 MG tablet, Take 1 tablet by mouth Daily With Dinner., Disp: 90 tablet, Rfl: 3    Ventolin  (90 Base) MCG/ACT inhaler, , Disp: , Rfl:     benzonatate (Tessalon Perles) 100 MG capsule, Take 1 capsule by mouth 3 (Three) Times a Day As Needed for Cough., Disp: 30 capsule, Rfl: 0    fluticasone (FLONASE) 50 MCG/ACT nasal spray, Administer 2 sprays into the nostril(s) as directed by provider Daily., Disp: 11.1 g, Rfl: 2    furosemide (LASIX) 40 MG tablet, Take 1 tablet by mouth 2 (Two) Times a Day., Disp: , Rfl:     Semaglutide-Weight Management (Wegovy) 0.25 MG/0.5ML solution auto-injector, Inject 0.5 mL under the skin into the appropriate area as directed 1 (One) Time Per Week for 28 days, THEN 1 mL 1 (One) Time Per Week for 28 days, THEN 2 mL 1 (One) Time Per Week for 28 days, THEN 3.4 mL 1 (One) Time Per Week for 28 days, THEN 4.8 mL 1 (One) Time Per Week for 28 days., Disp: 46.8 mL, Rfl: 0    valsartan (DIOVAN) 320 MG tablet, Take 1 tablet by mouth Daily., Disp: 90 tablet, Rfl: 1  No current  "facility-administered medications for this visit.    Facility-Administered Medications Ordered in Other Visits:     Chlorhexidine Gluconate Cloth 2 % pads 1 application, 1 application , Topical, Q12H GENNARON, Maura Solis APRN    Allergies:   Allergies   Allergen Reactions    Ceclor [Cefaclor] Rash    Lidocaine Hcl-Sodium Chloride Hives       Family History:   Family History   Problem Relation Age of Onset    Hypertension Mother     Hyperlipidemia Mother     Esophageal cancer Mother     Stroke Father     Hypertension Father     Hyperlipidemia Father        Social History:   Social History     Socioeconomic History    Marital status: Significant Other    Number of children: 1   Tobacco Use    Smoking status: Every Day     Current packs/day: 1.00     Average packs/day: 1 pack/day for 35.0 years (35.0 ttl pk-yrs)     Types: Cigarettes     Passive exposure: Current    Smokeless tobacco: Current     Types: Snuff   Vaping Use    Vaping status: Some Days    Substances: Nicotine, Flavoring    Devices: Refillable tank    Passive vaping exposure: Yes   Substance and Sexual Activity    Alcohol use: Yes     Comment: rarely    Drug use: Never    Sexual activity: Yes     Partners: Female         Objective     Vital Signs  BP (!) 172/102 (BP Location: Left arm, Patient Position: Sitting, Cuff Size: Large Adult)   Pulse 90   Temp 98.4 °F (36.9 °C) (Temporal)   Ht 177.8 cm (70\")   Wt (!) 143 kg (316 lb)   SpO2 96%   BMI 45.34 kg/m²   Estimated body mass index is 45.34 kg/m² as calculated from the following:    Height as of this encounter: 177.8 cm (70\").    Weight as of this encounter: 143 kg (316 lb).            Physical Exam  Vitals and nursing note reviewed.   Constitutional:       General: He is not in acute distress.     Appearance: Normal appearance. He is not ill-appearing or toxic-appearing.   HENT:      Head: Normocephalic and atraumatic.      Right Ear: Ear canal and external ear normal.      Left Ear: Ear canal " and external ear normal.      Ears:      Comments: Bilateral displaced cone of light.      Nose: Nose normal.      Mouth/Throat:      Mouth: Mucous membranes are moist.      Pharynx: Oropharynx is clear.   Eyes:      General: No scleral icterus.        Right eye: No discharge.         Left eye: No discharge.      Conjunctiva/sclera: Conjunctivae normal.   Cardiovascular:      Rate and Rhythm: Normal rate and regular rhythm.      Heart sounds: Normal heart sounds.   Pulmonary:      Effort: Pulmonary effort is normal.      Breath sounds: Normal breath sounds.   Abdominal:      General: Bowel sounds are normal. There is distension.      Palpations: There is no mass.      Tenderness: There is no abdominal tenderness. There is no guarding.      Hernia: No hernia is present.   Musculoskeletal:         General: Normal range of motion.      Cervical back: Normal range of motion and neck supple. No rigidity or tenderness.      Right lower leg: Edema present.      Left lower leg: Edema present.      Comments: Trace nonpitting bilateral lower extremity edema.   Lymphadenopathy:      Cervical: No cervical adenopathy.   Skin:     General: Skin is warm.   Neurological:      General: No focal deficit present.      Mental Status: He is alert.   Psychiatric:         Mood and Affect: Mood normal.         Behavior: Behavior normal.         Thought Content: Thought content normal.         Judgment: Judgment normal.          Assessment and Plan     Procedures      Lab Results (last 72 hours)       Procedure Component Value Units Date/Time    POC Glycosylated Hemoglobin (Hb A1C) [[id number]]  (Abnormal) Collected: 12/26/24 1110    Specimen: Blood Updated: 12/26/24 1110     Hemoglobin A1C 6.9 %      Lot Number [id number]     Expiration Date 08/29/2026               Diagnoses and all orders for this visit:    1. Fatty liver (Primary)  Comments:  Refer to gastroenterology for elevated fatty liver/possible fibrosis.  Orders:  -     Cancel:  Ambulatory Referral to Gastroenterology  -     Ambulatory Referral to Gastroenterology    2. Primary hypertension  Comments:  Losartan will be increased from 160 mg daily dosing to 320 mg daily dosing  Orders:  -     valsartan (DIOVAN) 320 MG tablet; Take 1 tablet by mouth Daily.  Dispense: 90 tablet; Refill: 1    3. Colon cancer screening  Comments:  Patient has an abnormal Cologard screen with recommended colonoscopy.  Orders:  -     Cancel: Ambulatory Referral to Gastroenterology  -     Ambulatory Referral to Gastroenterology    4. Acute cough  -     benzonatate (Tessalon Perles) 100 MG capsule; Take 1 capsule by mouth 3 (Three) Times a Day As Needed for Cough.  Dispense: 30 capsule; Refill: 0    5. Sinus drainage  -     fluticasone (FLONASE) 50 MCG/ACT nasal spray; Administer 2 sprays into the nostril(s) as directed by provider Daily.  Dispense: 11.1 g; Refill: 2    6. Type 2 diabetes mellitus with hyperglycemia, without long-term current use of insulin  Comments:  Continue low carbohydrate diet.  Orders:  -     POC Glycosylated Hemoglobin (Hb A1C)  -     Semaglutide-Weight Management (Wegovy) 0.25 MG/0.5ML solution auto-injector; Inject 0.5 mL under the skin into the appropriate area as directed 1 (One) Time Per Week for 28 days, THEN 1 mL 1 (One) Time Per Week for 28 days, THEN 2 mL 1 (One) Time Per Week for 28 days, THEN 3.4 mL 1 (One) Time Per Week for 28 days, THEN 4.8 mL 1 (One) Time Per Week for 28 days.  Dispense: 46.8 mL; Refill: 0    7. Morbid (severe) obesity due to excess calories  Comments:  initiate Wegovy as directed for diabetes and weight loss.  Orders:  -     Semaglutide-Weight Management (Wegovy) 0.25 MG/0.5ML solution auto-injector; Inject 0.5 mL under the skin into the appropriate area as directed 1 (One) Time Per Week for 28 days, THEN 1 mL 1 (One) Time Per Week for 28 days, THEN 2 mL 1 (One) Time Per Week for 28 days, THEN 3.4 mL 1 (One) Time Per Week for 28 days, THEN 4.8 mL 1 (One) Time  Per Week for 28 days.  Dispense: 46.8 mL; Refill: 0    8. Lung nodule seen on imaging study  -      CT Chest Low Dose Cancer Screening WO; Future        Assessment & Plan  1. Hypertension.  His blood pressure readings have been consistently elevated. The dosage of valsartan will be increased from 160 mg to 320 mg, to be taken once daily. He is advised to monitor his blood pressure at home and elevate his legs at night.    2. Fatty liver with potential fibrosis.  There is an elevation in liver enzymes, suggesting possible fatty liver disease. A referral to gastroenterology will be made for further evaluation and management.    3. Type 2 diabetes mellitus.  His A1c level is currently at 6.9. He is advised to maintain a low-carbohydrate diet and avoid sugary beverages and sweets. A prescription for Wegovy will be provided to assist with weight management and diabetes control.    4. Cough.  The cough appears to be allergy-related. A prescription for a cough suppressant and a nasal spray will be provided to manage the symptoms.    5. Weight management.  A prescription for Wegovy will be provided to assist with weight management.    6. Aortic stenosis.  He has a history of aortic valve replacement in 2023. No current issues reported, but he is advised to continue regular follow-ups with his cardiologist.    7. Leg swelling.  He is advised to elevate his legs at night and reduce salt intake. Lasix was previously ineffective, but he may consider retrying it if needed.    8. Hyperlipidemia.  Continue current medication regimen and dietary modifications.    9. Gastroesophageal reflux disease (GERD).  Continue current management and dietary modifications.    10. History of stroke.  Continue current management and regular follow-ups.    11. Tobacco use.  He smokes a pack a day. Smoking cessation counseling is recommended.    12. Sinus issues.  He reports ongoing sinus issues and ringing in his ears. A nasal spray will be  provided to help with drainage.    13. History of cholecystectomy.  No current issues reported.    14. Dental issues.  He has a history of dental issues, including a tooth extraction due to infection, for which he took antibiotics.    Follow-up  The patient will follow up in 1 month for a recheck of his blood pressure.    PROCEDURE  Aortic valve replacement in 2023.  Cardiac catheterization last year.  Nasal septum reconstruction for sleep apnea.  Gallbladder removal.  Tooth extraction due to infection.      Follow Up  Return in about 1 month (around 1/26/2025).    Patient or patient representative verbalized consent for the use of Ambient Listening during the visit with  KYLAH Rey for chart documentation. 12/26/2024  14:22 EST    KYLAH Rey [facility]  [address] [place] [address] [address] [place] [address] [phone]

## 2024-12-27 ENCOUNTER — TELEPHONE (OUTPATIENT)
Dept: FAMILY MEDICINE CLINIC | Facility: CLINIC | Age: 59
End: 2024-12-27
Payer: COMMERCIAL

## 2024-12-27 NOTE — TELEPHONE ENCOUNTER
Pharmacy Name: [facility] [address] [facility] [address] [place] [address] [phone] [facility] [phone]      Pharmacy representative phone number: [phone]     What medication are you calling in regards to: WEGOVY    What question does the pharmacy have: THE PHARMACY IS NEEDING TO HAVE A PRESCRIPTION FOR EACH INDIVIDUAL STRENGTHS.     Who is the provider that prescribed the medication: ELODIA FREDERICK     Additional notes:

## 2024-12-30 ENCOUNTER — TELEPHONE (OUTPATIENT)
Dept: FAMILY MEDICINE CLINIC | Facility: CLINIC | Age: 59
End: 2024-12-30
Payer: COMMERCIAL

## 2024-12-30 DIAGNOSIS — E11.65 TYPE 2 DIABETES MELLITUS WITH HYPERGLYCEMIA, WITHOUT LONG-TERM CURRENT USE OF INSULIN: ICD-10-CM

## 2024-12-30 DIAGNOSIS — E66.01 MORBID (SEVERE) OBESITY DUE TO EXCESS CALORIES: Primary | ICD-10-CM

## 2024-12-30 RX ORDER — SEMAGLUTIDE 0.68 MG/ML
0.25 INJECTION, SOLUTION SUBCUTANEOUS WEEKLY
Qty: 3 ML | Refills: 0 | Status: SHIPPED | OUTPATIENT
Start: 2024-12-30 | End: 2024-12-30 | Stop reason: SDUPTHER

## 2024-12-30 NOTE — TELEPHONE ENCOUNTER
Pt called states Pharm wont refill medication bc they dont understand the directions. Can you resend

## 2024-12-31 ENCOUNTER — TELEPHONE (OUTPATIENT)
Dept: FAMILY MEDICINE CLINIC | Facility: CLINIC | Age: 59
End: 2024-12-31
Payer: COMMERCIAL

## 2024-12-31 RX ORDER — SEMAGLUTIDE 0.68 MG/ML
0.25 INJECTION, SOLUTION SUBCUTANEOUS WEEKLY
Qty: 3 ML | Refills: 0 | Status: SHIPPED | OUTPATIENT
Start: 2024-12-31

## 2024-12-31 NOTE — TELEPHONE ENCOUNTER
Patient had Ozempic sent in today to [facility] in [place].  He had it also sent to [facility] in [place] yesterday.  He also had Wegovy sent into [facility] on 26 December.  The medications have been sent to [facility].  I am uncertain of the PA status.

## 2024-12-31 NOTE — TELEPHONE ENCOUNTER
Spoke with patient and PA has been started told patient we have sent it and started the PA process.

## 2024-12-31 NOTE — TELEPHONE ENCOUNTER
PATIENT WANTING WEIGHT LOSS DRUG, WHEN ALEXANDER WAS HERE, HE HAD TRIED TO GET MOUNJARO AND OZEMPIC, BUT COULD GET NEITHER, HE IS SUPPOSED TO TRY WEGOVY, BUT IT HAS NOT BEEN SENT IN.  SOMEONE TRYING TO GET PA ON OZEMPIC, HE WILL TAKE WHICHEVER WEIGHT LOSS DRUG HE CAN GET  [facility]

## 2025-01-03 DIAGNOSIS — J01.90 ACUTE SINUSITIS, RECURRENCE NOT SPECIFIED, UNSPECIFIED LOCATION: Primary | ICD-10-CM

## 2025-01-03 RX ORDER — AZITHROMYCIN 250 MG/1
TABLET, FILM COATED ORAL
Qty: 6 TABLET | Refills: 0 | Status: SHIPPED | OUTPATIENT
Start: 2025-01-03

## 2025-01-18 RX ORDER — LORATADINE 10 MG/1
10 TABLET ORAL DAILY
Qty: 90 TABLET | Refills: 0 | Status: SHIPPED | OUTPATIENT
Start: 2025-01-18

## 2025-01-31 ENCOUNTER — OFFICE VISIT (OUTPATIENT)
Dept: FAMILY MEDICINE CLINIC | Facility: CLINIC | Age: 60
End: 2025-01-31
Payer: COMMERCIAL

## 2025-01-31 VITALS
TEMPERATURE: 98.9 F | WEIGHT: 302.6 LBS | HEIGHT: 70 IN | BODY MASS INDEX: 43.32 KG/M2 | OXYGEN SATURATION: 95 % | DIASTOLIC BLOOD PRESSURE: 90 MMHG | SYSTOLIC BLOOD PRESSURE: 160 MMHG | RESPIRATION RATE: 16 BRPM | HEART RATE: 87 BPM

## 2025-01-31 DIAGNOSIS — E11.65 TYPE 2 DIABETES MELLITUS WITH HYPERGLYCEMIA, WITHOUT LONG-TERM CURRENT USE OF INSULIN: ICD-10-CM

## 2025-01-31 PROCEDURE — 99213 OFFICE O/P EST LOW 20 MIN: CPT | Performed by: NURSE PRACTITIONER

## 2025-01-31 RX ORDER — SEMAGLUTIDE 0.68 MG/ML
0.5 INJECTION, SOLUTION SUBCUTANEOUS WEEKLY
Qty: 3 ML | Refills: 0 | Status: SHIPPED | OUTPATIENT
Start: 2025-01-31

## 2025-01-31 NOTE — PROGRESS NOTES
Office Note     Name: John Wolfe    : 1965     MRN: [id number]     Chief Complaint  Follow-up (1 month), Diabetes, Hyperlipidemia, and Hypertension    Subjective     History of Present Illness:  Jonh Wolfe is a 59 y.o. male who presents today for a one month check up after starting semaglutide.   History of Present Illness  The patient is a 59-year-old male who presents for evaluation of hypertension, diabetes mellitus, atrial fibrillation, and fatty liver.    He reports an overall improvement in his health status since his last visit on 2024. He has been adhering to the increased dosage of losartan, which was escalated from 160 mg to 320 mg. He has an appointment with gastroenterology next month for his elevated liver enzymes, fatty liver disease, and abnormal Cologuard.     He has been approved for Ozempic and has initiated the treatment, having taken it three times to date. He does not regularly monitor his blood glucose levels but reports feeling better and believes the medication is beneficial and he has lost weight. He expresses a desire to increase the dosage of Ozempic to further improve his blood glucose control. He is due to start the increased dosage of Ozempic this week and needs a refill. He has been making dietary modifications, including reducing his carbohydrate intake. He occasionally indulges in sweets and Chinese food. He is currently on a 0.25 mg dose of Ozempic and but needs 0.5 mg dose and requires refills. He experienced heartburn after consuming pizza last night.    His blood pressure is elevated today. He contributes this to working overnight and getting a few hours of sleep.     He continues to take Xarelto for his atrial fibrillation.    He has been utilizing a vibration laser to manage leg swelling and fluid leakage. He notes that the leg swelling is less severe than previous episodes.    MEDICATIONS  Current: Losartan, Ozempic, Xarelto    Review of Systems:    Review of Systems    Past Medical History:   Past Medical History:   Diagnosis Date    Aortic valve stenosis     Diabetes mellitus     GERD (gastroesophageal reflux disease)     Heart murmur     Hyperlipidemia     Hypertension     Stroke        Past Surgical History:   Past Surgical History:   Procedure Laterality Date    AORTIC VALVE REPAIR/REPLACEMENT N/A 2/23/2023    Procedure: TRANSCATHETER AORTIC VALVE REPLACEMENT,TEODORO;  Surgeon: Clemente Bargre MD;  Location: [facility];  Service: Cardiothoracic;  Laterality: N/A;  FLOURO  DOSE  CONTRAST    AORTIC VALVE REPAIR/REPLACEMENT N/A 2/23/2023    Procedure: Transfemoral Transcatheter Aortic Valve Replacement;  Surgeon: Edwin Guan MD;  Location: [facility];  Service: Cardiovascular;  Laterality: N/A;    CARDIAC CATHETERIZATION N/A 01/26/2023    Procedure: Left Heart Cath;  Surgeon: Edwin Guan MD;  Location: [facility] CATH INVASIVE LOCATION;  Service: Cardiology;  Laterality: N/A;    CHOLECYSTECTOMY      NASAL SEPTAL RECONSTRUCTION         Immunizations:   Immunization History   Administered Date(s) Administered    Influenza Seasonal Injectable 10/01/2019    Pneumococcal Conjugate 20-Valent (PCV20) 05/23/2024        Medications:     Current Outpatient Medications:     aspirin 81 MG chewable tablet, Chew 1 tablet Daily., Disp: 30 tablet, Rfl: 0    atorvastatin (LIPITOR) 80 MG tablet, Take 1 tablet by mouth Every Night., Disp: 90 tablet, Rfl: 0    ezetimibe (Zetia) 10 MG tablet, Take 1 tablet by mouth Daily., Disp: 30 tablet, Rfl: 1    fluticasone (FLONASE) 50 MCG/ACT nasal spray, Administer 2 sprays into the nostril(s) as directed by provider Daily., Disp: 11.1 g, Rfl: 2    loratadine (CLARITIN) 10 MG tablet, Take 1 tablet by mouth once daily, Disp: 90 tablet, Rfl: 0    metoprolol succinate XL (TOPROL-XL) 50 MG 24 hr tablet, Take 1 tablet by mouth Daily., Disp: , Rfl:     rivaroxaban (Xarelto) 20 MG tablet, Take 1 tablet by mouth Daily  "With Dinner., Disp: 90 tablet, Rfl: 3    Semaglutide,0.25 or 0.5MG/DOS, (Ozempic, 0.25 or 0.5 MG/DOSE,) 2 MG/3ML solution pen-injector, Inject 0.5 mg under the skin into the appropriate area as directed 1 (One) Time Per Week. For 4 weeks, then increase to 0.5 mg weekly., Disp: 3 mL, Rfl: 0    valsartan (DIOVAN) 320 MG tablet, Take 1 tablet by mouth Daily., Disp: 90 tablet, Rfl: 1    Ventolin  (90 Base) MCG/ACT inhaler, , Disp: , Rfl:   No current facility-administered medications for this visit.    Facility-Administered Medications Ordered in Other Visits:     Chlorhexidine Gluconate Cloth 2 % pads 1 application, 1 application , Topical, Q12H PRN, Maura Solis APRN    Allergies:   Allergies   Allergen Reactions    Ceclor [Cefaclor] Rash    Lidocaine Hcl-Sodium Chloride Hives       Family History:   Family History   Problem Relation Age of Onset    Hypertension Mother     Hyperlipidemia Mother     Esophageal cancer Mother     Stroke Father     Hypertension Father     Hyperlipidemia Father        Social History:   Social History     Socioeconomic History    Marital status: Significant Other    Number of children: 1   Tobacco Use    Smoking status: Every Day     Current packs/day: 1.00     Average packs/day: 1 pack/day for 41.1 years (41.1 ttl pk-yrs)     Types: Cigarettes     Start date: 1984     Passive exposure: Current    Smokeless tobacco: Current     Types: Snuff   Vaping Use    Vaping status: Some Days    Substances: Nicotine, Flavoring    Devices: Refillable tank    Passive vaping exposure: Yes   Substance and Sexual Activity    Alcohol use: Yes     Comment: rarely    Drug use: Never    Sexual activity: Yes     Partners: Female         Objective     Vital Signs  /90 (BP Location: Left arm, Patient Position: Sitting, Cuff Size: Large Adult)   Pulse 87   Temp 98.9 °F (37.2 °C) (Temporal)   Resp 16   Ht 177.8 cm (70\")   Wt (!) 137 kg (302 lb 9.6 oz)   SpO2 95%   BMI 43.42 kg/m² " "  Estimated body mass index is 43.42 kg/m² as calculated from the following:    Height as of this encounter: 177.8 cm (70\").    Weight as of this encounter: 137 kg (302 lb 9.6 oz).        Physical Exam  Vitals and nursing note reviewed.   Constitutional:       General: He is not in acute distress.     Appearance: Normal appearance. He is not ill-appearing or toxic-appearing.   HENT:      Head: Normocephalic and atraumatic.   Eyes:      General: No scleral icterus.        Right eye: No discharge.         Left eye: No discharge.      Conjunctiva/sclera: Conjunctivae normal.   Cardiovascular:      Rate and Rhythm: Normal rate and regular rhythm.      Heart sounds: Normal heart sounds.   Pulmonary:      Effort: Pulmonary effort is normal.      Breath sounds: Normal breath sounds.   Musculoskeletal:         General: Normal range of motion.      Cervical back: Normal range of motion and neck supple.      Right lower leg: Edema present.      Left lower leg: Edema present.      Comments: +1 pitting bilateral lower extremity edema.   Skin:     General: Skin is warm.   Neurological:      General: No focal deficit present.      Mental Status: He is alert.   Psychiatric:         Mood and Affect: Mood normal.         Behavior: Behavior normal.         Thought Content: Thought content normal.         Judgment: Judgment normal.          Assessment and Plan     Procedures      Lab Results (last 72 hours)       ** No results found for the last 72 hours. **             Results for orders placed or performed in visit on 12/26/24   POC Glycosylated Hemoglobin (Hb A1C)    Collection Time: 12/26/24 11:10 AM    Specimen: Blood   Result Value Ref Range    Hemoglobin A1C 6.9 (A) 4.5 - 5.7 %    Lot Number [id number]     Expiration Date 08/29/2026         Diagnoses and all orders for this visit:    1. Type 2 diabetes mellitus with hyperglycemia, without long-term current use of insulin  Comments:  Patient has lost 14 pounds since starting " semaglutide. Continue medication, dosage increased to 0.5 mg weekly.  Orders:  -     Semaglutide,0.25 or 0.5MG/DOS, (Ozempic, 0.25 or 0.5 MG/DOSE,) 2 MG/3ML solution pen-injector; Inject 0.5 mg under the skin into the appropriate area as directed 1 (One) Time Per Week. For 4 weeks, then increase to 0.5 mg weekly.  Dispense: 3 mL; Refill: 0        Assessment & Plan  1. Hypertension.  His blood pressure is elevated at 160/90. He is currently on an increased dose of losartan 320 mg. The patient agrees to monitor his blood pressure at home and will notify the clinic if his blood pressure remains elevated.     2. Diabetes Mellitus.  He has lost 14 pounds since December 26, 2024, and reports feeling better. He is currently on Ozempic 0.25 mg and has taken it three times. His A1c was last checked in December 2024. He is advised to continue monitoring his blood sugar levels and report any symptoms such as nausea. The dosage will be increased to 0.5 mg, and a prescription for a one-month supply has been sent to [facility] pharmacy. A follow-up A1c test is scheduled for early to mid-April 2025 to assess his blood sugar levels and weight loss progress.    3. Atrial Fibrillation.  He is currently taking Xarelto.    4. Fatty Liver.  He has a history of elevated liver enzymes and fatty liver. He has an upcoming appointment with a gastroenterologist next month for further evaluation. He is advised to keep this appointment and ensure both his liver and colon are addressed.    5. Leg Swelling.  He reports some swelling in his legs, which has improved with weight loss. He is advised to continue weight management to help reduce fluid retention.    Follow-up  The patient will follow up in April 2025.      Follow Up  Return in about 6 weeks (around 3/14/2025).    Patient or patient representative verbalized consent for the use of Ambient Listening during the visit with  KYLAH Rey for chart documentation. 1/31/2025  11:26  EST    KYLAH Rey [facility]  [address] [place] [address] [address] [place] [address] [phone]

## 2025-02-02 DIAGNOSIS — R91.1 LUNG NODULE SEEN ON IMAGING STUDY: Primary | ICD-10-CM

## 2025-02-10 ENCOUNTER — TELEPHONE (OUTPATIENT)
Dept: FAMILY MEDICINE CLINIC | Facility: CLINIC | Age: 60
End: 2025-02-10
Payer: COMMERCIAL

## 2025-02-10 NOTE — TELEPHONE ENCOUNTER
Attempted to call patient, no answer.  Vm left for patient to return call. \Patient viewed results via [employer].  Relay   results

## 2025-02-10 NOTE — TELEPHONE ENCOUNTER
----- Message from Joe Man sent at 2/10/2025 12:26 PM EST -----  Patient CT scan reveals stable pulmonary nodules.  Radiologist's suggest 1 year follow-up.

## 2025-02-12 NOTE — TELEPHONE ENCOUNTER
Contacted patient to speak about results. No answer, left voicemail asking patient to contact our office to review results. Patient also reviewed results on [facility].

## 2025-02-28 ENCOUNTER — OFFICE VISIT (OUTPATIENT)
Dept: NEUROLOGY | Facility: CLINIC | Age: 60
End: 2025-02-28
Payer: COMMERCIAL

## 2025-02-28 VITALS
HEART RATE: 88 BPM | WEIGHT: 299 LBS | HEIGHT: 70 IN | SYSTOLIC BLOOD PRESSURE: 162 MMHG | OXYGEN SATURATION: 95 % | DIASTOLIC BLOOD PRESSURE: 88 MMHG | TEMPERATURE: 98.2 F | BODY MASS INDEX: 42.8 KG/M2

## 2025-02-28 DIAGNOSIS — E78.5 HYPERLIPIDEMIA, UNSPECIFIED HYPERLIPIDEMIA TYPE: ICD-10-CM

## 2025-02-28 DIAGNOSIS — Z86.73 HISTORY OF STROKE: Primary | ICD-10-CM

## 2025-02-28 DIAGNOSIS — I48.0 PAROXYSMAL A-FIB: ICD-10-CM

## 2025-02-28 DIAGNOSIS — I10 ESSENTIAL HYPERTENSION: ICD-10-CM

## 2025-02-28 DIAGNOSIS — E11.65 TYPE 2 DIABETES MELLITUS WITH HYPERGLYCEMIA, WITHOUT LONG-TERM CURRENT USE OF INSULIN: ICD-10-CM

## 2025-02-28 NOTE — PROGRESS NOTES
Follow Up Office Visit      Encounter Date: 2025   Patient Name: John Wolfe  : 1965   MRN: [id number]   PCP: Austin Cruz APRN    Chief Complaint:    Chief Complaint   Patient presents with    Follow-up       History of Present Illness:  John Wolfe is a 57 y.o. male with known medical diagnoses of essential hypertension, hyperlipidemia, diabetes mellitus type 2, coronary artery disease s/p TAVR (2023, Dr. Barger), ongoing tobacco abuse who presents to clinic today for  follow-up s/p left cerebellar infarct.     Mr. Wolfe was admitted to our facility on  for TAVR procedure performed by Dr. Barger.  Postprocedure the patient developed an acute onset of diplopia prompting an MRI of the brain without contrast to be performed which revealed a left cerebellar infarct.  Stroke neurology was consulted for further evaluation and recommendations.  He was not a candidate for IV thrombolytic therapy secondary to recent cardiac surgery and was not a candidate for endovascular therapy as symptoms were inconsistent with large vessel occlusive stroke.  He was evaluated by Dr. Little who also felt that the patient had a MRI occult right brainstem infarct (posterior mare/midbrain) given his opthomalmoplegia.  The patient was discharged home on DAPT therapy, high intensity statin, and a mobile cardiac monitor.  His cardiac monitor revealed a paroxysmal atrial fibrillation therefore he was started on Xarelto 20 mg daily on 3/17/2023.     Since discharge the patient denies any new strokelike symptoms.  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  He tells me he has not been evaluated by ophthalmology however has been operating a vehicle without difficulty.  He tells me that he has remained compliant on his Xarelto 20 mg, ASA 81 mg, and atorvastatin 80 mg with no adverse side effects.  He is independent of his ADLs and walks  without an assistive device.  He denies any recent falls.  He does tell me that he has been slowly reincorporating exercise into his daily routine and is able to ride the stationary bike for 30 minutes/day.  He is hoping that increasing his physical activity also helps him with weight loss.  He does continue to smoke however tells me that he has significantly cut back.  We discussed the option of trying Wellbutrin which would aid and smoking cessation as well as weight loss.  He states he would like to think about this and talk with his primary care physician before starting.     Clinic Visit 11/17/2023: Patient presents today for follow-up.  He denies any new strokelike symptoms.  Continues to have diplopia when looking to his left; cranial nerve III palsy in the right eye.  Has not seen ophthalmology.  He reports compliance with all medications including Xarelto and aspirin; denies any signs and symptoms of bleeding.  Reports his blood pressures typically in the 130-140 range; he ran out of his amlodipine about 1 week ago.  Has been trying to exercise and eat healthier.  Continues to smoke 1 pack daily, he is interested in cutting back.  Discussed that his stop bang score is elevated and dictating risk of obstructive sleep apnea; he is not interested in a sleep evaluation at this time.     Clinic visit 2/23/2024: Patient is doing well. He reports no new or worsening stroke like symptoms. He has been taking his medications with no issues or side effects. He has not been on a statin due to having elevated AST/ALT last visit. I will recheck that today. His lipid panel is quite elevated so it would be ideal if possible to restart a statin. He is currently on zetia.     Clinic visit 2/28/2025: Patient presents to clinic today for his 1 year follow-up.  He has been doing well.  Denies any new or worsening strokelike symptoms.  We reviewed his medication regimen which includes a aspirin, Xarelto, Zetia.  Patient should  not be continuing Lipitor until his liver enzymes are rechecked.  He reports that he is going for lab work with his primary care provider next month.  Patient reports he has been told he has fatty liver disease in the past due to weight gain.  He is actively working on weight loss right now.  He was started on Ozempic and has already lost 20 pounds.  Subjective        I have reviewed and the following portions of the patient's history were updated as appropriate: past family history, past medical history, past social history, past surgical history and problem list.    Medications:     Current Outpatient Medications:     aspirin 81 MG chewable tablet, Chew 1 tablet Daily., Disp: 30 tablet, Rfl: 0    ezetimibe (Zetia) 10 MG tablet, Take 1 tablet by mouth Daily., Disp: 30 tablet, Rfl: 1    fluticasone (FLONASE) 50 MCG/ACT nasal spray, Administer 2 sprays into the nostril(s) as directed by provider Daily., Disp: 11.1 g, Rfl: 2    loratadine (CLARITIN) 10 MG tablet, Take 1 tablet by mouth once daily, Disp: 90 tablet, Rfl: 0    metoprolol succinate XL (TOPROL-XL) 50 MG 24 hr tablet, Take 1 tablet by mouth Daily., Disp: , Rfl:     rivaroxaban (Xarelto) 20 MG tablet, Take 1 tablet by mouth Daily With Dinner., Disp: 90 tablet, Rfl: 3    Semaglutide,0.25 or 0.5MG/DOS, (Ozempic, 0.25 or 0.5 MG/DOSE,) 2 MG/3ML solution pen-injector, Inject 0.5 mg under the skin into the appropriate area as directed 1 (One) Time Per Week. For 4 weeks, then increase to 0.5 mg weekly., Disp: 3 mL, Rfl: 0    valsartan (DIOVAN) 320 MG tablet, Take 1 tablet by mouth Daily., Disp: 90 tablet, Rfl: 1    Ventolin  (90 Base) MCG/ACT inhaler, , Disp: , Rfl:     Sod Picosulfate-Mag Ox-Cit Acd 10-3.5-12 MG-GM -GM/160ML solution, Take 350 mL by mouth Take As Directed for 1 dose. (Patient not taking: Reported on 2/28/2025), Disp: 350 mL, Rfl: 0  No current facility-administered medications for this visit.    Facility-Administered Medications Ordered in  "Other Visits:     Chlorhexidine Gluconate Cloth 2 % pads 1 application, 1 application , Topical, Q12H PRN, Maura Solis, KYLAH    Allergies:   Allergies   Allergen Reactions    Ceclor [Cefaclor] Rash    Lidocaine Hcl-Sodium Chloride Hives       Objective     Physical Exam:   Vital Signs:   Vitals:    02/28/25 1411   BP: 162/88   Pulse: 88   Temp: 98.2 °F (36.8 °C)   SpO2: 95%   Weight: 136 kg (299 lb)   Height: 177.8 cm (70\")     Body mass index is 42.9 kg/m².    Physical Exam  Vitals and nursing note reviewed.   Constitutional:       General: He is not in acute distress.     Appearance: Normal appearance. He is obese. He is not ill-appearing.      Comments: 59-year-old  male   HENT:      Head: Normocephalic and atraumatic.      Nose: Nose normal.      Mouth/Throat:      Mouth: Mucous membranes are moist.   Eyes:      Extraocular Movements: Extraocular movements intact.      Pupils: Pupils are equal, round, and reactive to light.   Cardiovascular:      Rate and Rhythm: Normal rate and regular rhythm.      Pulses: Normal pulses.   Pulmonary:      Effort: Pulmonary effort is normal. No respiratory distress.   Skin:     General: Skin is warm and dry.   Neurological:      General: No focal deficit present.      Mental Status: He is alert and oriented to person, place, and time. Mental status is at baseline.   Psychiatric:         Mood and Affect: Mood normal.         Behavior: Behavior normal.       NIH 0    Modified Prosper Score: 0        0  No Symptoms    1 No significant disability. Able to carry out all usual activities, despite some symptoms.    2 Slight disability. Able to look after own affairs without assistance, but unable to carry out all previous activities.    3 Moderate disability. Requires some help, but able to walk unassisted.    4 Moderately severe disability. Unable to attend to own bodily needs without assistance, and unable to walk unassisted.    5 Severe disability. Requires constant " nursing care and attention, bedridden, incontinent.    6 Dead      PHQ-9 Depression Screening  Little interest or pleasure in doing things? Not at all   Feeling down, depressed, or hopeless? Not at all   PHQ-2 Total Score 0   Trouble falling or staying asleep, or sleeping too much?     Feeling tired or having little energy?     Poor appetite or overeating?     Feeling bad about yourself - or that you are a failure or have let yourself or your family down?     Trouble concentrating on things, such as reading the newspaper or watching television?     Moving or speaking so slowly that other people could have noticed? Or the opposite - being so fidgety or restless that you have been moving around a lot more than usual?     Thoughts that you would be better off dead, or of hurting yourself in some way?     PHQ-9 Total Score     If you checked off any problems, how difficult have these problems made it for you to do your work, take care of things at home, or get along with other people?         IGOR Fall Risk Clinician Key Questions   Have you fallen in the past year?: No  Do you feel unsteady with walking?: No  Are you worried about falling?: No      Hemoglobin   Date Value Ref Range Status   04/23/2024 15.8 13.0 - 17.7 g/dL Final     Hematocrit   Date Value Ref Range Status   04/23/2024 47.4 37.5 - 51.0 % Final     Platelets   Date Value Ref Range Status   04/23/2024 202 140 - 450 10*3/mm3 Final     Hemoglobin A1C   Date Value Ref Range Status   12/26/2024 6.9 (A) 4.5 - 5.7 % Final   07/23/2024 6.50 (H) 4.80 - 5.60 % Final     LDL Cholesterol    Date Value Ref Range Status   04/23/2024 45 0 - 100 mg/dL Final     AST (SGOT)   Date Value Ref Range Status   07/23/2024 52 (H) 1 - 40 U/L Final     ALT (SGPT)   Date Value Ref Range Status   07/23/2024 66 (H) 1 - 41 U/L Final       Assessment / Plan      Assessment/Plan:   Diagnoses and all orders for this visit:     1. History of stroke, MRI occult right brainstem and left  cerebellar (Primary), etiology paroxysmal atrial fibrillation  -Continue Xarelto 20 mg and ASA 81 mg daily  -Continue risk factor modification strategies such as heart healthy diet and increased activity   -STOP BANG score 6 indicating high risk of obstructive sleep apnea; patient not interested in a sleep study at this time  -follow up in stroke clinic on an annual basis or call if need for sooner appointment      2. Essential hypertension  -Continue antihypertensive medications per primary care and cardiology recommendations  -Goal blood pressure <130/80     3. Hyperlipidemia LDL goal <70  -LDL 45  -continue Zetia for now  -resume atorvastatin 80 mg if liver enzymes are at an acceptable level   Patient will be following up with his primary care provider soon to recheck his liver enzymes    4. Type 2 diabetes mellitus with hyperglycemia, without long-term current use of insulin  -A1c 6.9, goal <7  -Patient has been prescribed Ozempic and has lost weight and has better control over his blood sugars     5. Paroxysmal A-fib  -Continue rivaroxaban (Xarelto) 20 MG   -Keep scheduled appointment with Dr. Guan     6. Tobacco use  -Patient educated on the importance of smoking cessation to decrease risk of future cardiovascular and cerebrovascular events.  Also discussed how tobacco use contributes to progression of atherosclerotic disease and lead to pulmonary complications.  Patient not willing to cut back at this time.        Discussed the importance of medication compliance (Xarelto 20 mg, ASA 81 mg, and atorvastatin 80 mg) and lifestyle modifications (adequate blood pressure control, adequate control of hyperlipidemia, adequate glycemic control, increased physical activity, smoking cessation and healthy diet) to help reduce the risk of future cerebrovascular events.  Also discussed the signs symptoms that would warrant the patient return back to the emergency department including unilateral weakness, unilateral  numbness, visual disturbances, loss of balance, speech difficulties, and/or a sudden severe headache.  Patient voices understanding.  He has been encouraged to contact our office should he have any questions or concerns prior to his next follow-up appointment.  Follow Up:   Return in about 1 year (around 2/28/2026).    KYLAH Cooper  [facility]

## 2025-03-17 DIAGNOSIS — E11.65 TYPE 2 DIABETES MELLITUS WITH HYPERGLYCEMIA, WITHOUT LONG-TERM CURRENT USE OF INSULIN: ICD-10-CM

## 2025-03-17 RX ORDER — SEMAGLUTIDE 0.68 MG/ML
0.5 INJECTION, SOLUTION SUBCUTANEOUS WEEKLY
Qty: 3 ML | Refills: 0 | Status: SHIPPED | OUTPATIENT
Start: 2025-03-17

## 2025-03-17 NOTE — TELEPHONE ENCOUNTER
"     Caller: John Wolfe \"BILL\"    Relationship: Self    Best call back number: [phone]    Requested Prescriptions:   Requested Prescriptions     Pending Prescriptions Disp Refills    Semaglutide,0.25 or 0.5MG/DOS, (Ozempic, 0.25 or 0.5 MG/DOSE,) 2 MG/3ML solution pen-injector 3 mL 0     Sig: Inject 0.5 mg under the skin into the appropriate area as directed 1 (One) Time Per Week. For 4 weeks, then increase to 0.5 mg weekly.        Pharmacy where request should be sent:      Last office visit with prescribing clinician: 1/31/2025   Last telemedicine visit with prescribing clinician: Visit date not found   Next office visit with prescribing clinician: 5/2/2025     Additional details provided by patient: PT IS OUT AND NEEDS REFILLS ASAP    PT STATES HE WANTS TO STAY ON CURRENT DOSAGE AS WELL    Does the patient have less than a 3 day supply:  [x] Yes  [] No    Would you like a call back once the refill request has been completed: [x] Yes [] No    If the office needs to give you a call back, can they leave a voicemail: [x] Yes [] No    Andi Musa Rep   03/17/25 13:43 EDT         DELETE AFTER READING TO PATIENT: “Thank you for sharing this information with me. I will send a message to the clinical team. Please allow 48 hours for the clinical staff to follow up on this request.”  "

## 2025-04-09 DIAGNOSIS — E11.65 TYPE 2 DIABETES MELLITUS WITH HYPERGLYCEMIA, WITHOUT LONG-TERM CURRENT USE OF INSULIN: ICD-10-CM

## 2025-04-09 RX ORDER — SEMAGLUTIDE 0.68 MG/ML
INJECTION, SOLUTION SUBCUTANEOUS
Qty: 3 ML | Refills: 0 | Status: SHIPPED | OUTPATIENT
Start: 2025-04-09

## 2025-04-14 RX ORDER — SODIUM, POTASSIUM,MAG SULFATES 17.5-3.13G
1 SOLUTION, RECONSTITUTED, ORAL ORAL TAKE AS DIRECTED
Qty: 354 ML | Refills: 0 | Status: SHIPPED | OUTPATIENT
Start: 2025-04-14

## 2025-04-23 RX ORDER — LORATADINE 10 MG/1
10 TABLET ORAL DAILY
Qty: 90 TABLET | Refills: 3 | Status: SHIPPED | OUTPATIENT
Start: 2025-04-23

## 2025-05-02 ENCOUNTER — LAB (OUTPATIENT)
Dept: FAMILY MEDICINE CLINIC | Facility: CLINIC | Age: 60
End: 2025-05-02
Payer: COMMERCIAL

## 2025-05-02 ENCOUNTER — OFFICE VISIT (OUTPATIENT)
Dept: FAMILY MEDICINE CLINIC | Facility: CLINIC | Age: 60
End: 2025-05-02
Payer: COMMERCIAL

## 2025-05-02 VITALS
HEART RATE: 85 BPM | WEIGHT: 294.8 LBS | OXYGEN SATURATION: 96 % | DIASTOLIC BLOOD PRESSURE: 94 MMHG | RESPIRATION RATE: 15 BRPM | HEIGHT: 70 IN | SYSTOLIC BLOOD PRESSURE: 132 MMHG | BODY MASS INDEX: 42.2 KG/M2 | TEMPERATURE: 98 F

## 2025-05-02 DIAGNOSIS — Z12.5 PROSTATE CANCER SCREENING: ICD-10-CM

## 2025-05-02 DIAGNOSIS — Z11.59 NEED FOR HEPATITIS C SCREENING TEST: ICD-10-CM

## 2025-05-02 DIAGNOSIS — Z00.00 ANNUAL PHYSICAL EXAM: Primary | ICD-10-CM

## 2025-05-02 DIAGNOSIS — E11.65 TYPE 2 DIABETES MELLITUS WITH HYPERGLYCEMIA, WITHOUT LONG-TERM CURRENT USE OF INSULIN: ICD-10-CM

## 2025-05-02 DIAGNOSIS — Z00.00 ANNUAL PHYSICAL EXAM: ICD-10-CM

## 2025-05-02 DIAGNOSIS — E11.9 ENCOUNTER FOR DIABETIC FOOT EXAM: ICD-10-CM

## 2025-05-02 LAB
ALBUMIN SERPL-MCNC: 4.4 G/DL (ref 3.5–5.2)
ALBUMIN UR-MCNC: 1.3 MG/DL
ALBUMIN/GLOB SERPL: 1.4 G/DL
ALP SERPL-CCNC: 84 U/L (ref 39–117)
ALT SERPL W P-5'-P-CCNC: 69 U/L (ref 1–41)
ANION GAP SERPL CALCULATED.3IONS-SCNC: 12 MMOL/L (ref 5–15)
AST SERPL-CCNC: 48 U/L (ref 1–40)
BILIRUB SERPL-MCNC: 0.4 MG/DL (ref 0–1.2)
BUN SERPL-MCNC: 12 MG/DL (ref 8–23)
BUN/CREAT SERPL: 13 (ref 7–25)
CALCIUM SPEC-SCNC: 9.1 MG/DL (ref 8.6–10.5)
CHLORIDE SERPL-SCNC: 100 MMOL/L (ref 98–107)
CHOLEST SERPL-MCNC: 237 MG/DL (ref 0–200)
CO2 SERPL-SCNC: 24 MMOL/L (ref 22–29)
CREAT SERPL-MCNC: 0.92 MG/DL (ref 0.76–1.27)
CREAT UR-MCNC: 89.3 MG/DL
DEPRECATED RDW RBC AUTO: 44.4 FL (ref 37–54)
EGFRCR SERPLBLD CKD-EPI 2021: 95.2 ML/MIN/1.73
ERYTHROCYTE [DISTWIDTH] IN BLOOD BY AUTOMATED COUNT: 13.6 % (ref 12.3–15.4)
GLOBULIN UR ELPH-MCNC: 3.1 GM/DL
GLUCOSE SERPL-MCNC: 102 MG/DL (ref 65–99)
HBA1C MFR BLD: 6.1 % (ref 4.8–5.6)
HCT VFR BLD AUTO: 48.4 % (ref 37.5–51)
HCV AB SER QL: NORMAL
HDLC SERPL-MCNC: 30 MG/DL (ref 40–60)
HGB BLD-MCNC: 15.9 G/DL (ref 13–17.7)
LDLC SERPL CALC-MCNC: 164 MG/DL (ref 0–100)
LDLC/HDLC SERPL: 5.38 {RATIO}
MCH RBC QN AUTO: 29.5 PG (ref 26.6–33)
MCHC RBC AUTO-ENTMCNC: 32.9 G/DL (ref 31.5–35.7)
MCV RBC AUTO: 89.8 FL (ref 79–97)
MICROALBUMIN/CREAT UR: 14.6 MG/G (ref 0–29)
PLATELET # BLD AUTO: 193 10*3/MM3 (ref 140–450)
PMV BLD AUTO: 9.8 FL (ref 6–12)
POTASSIUM SERPL-SCNC: 3.6 MMOL/L (ref 3.5–5.2)
PROT SERPL-MCNC: 7.5 G/DL (ref 6–8.5)
PSA SERPL-MCNC: 1.05 NG/ML (ref 0–4)
RBC # BLD AUTO: 5.39 10*6/MM3 (ref 4.14–5.8)
SODIUM SERPL-SCNC: 136 MMOL/L (ref 136–145)
TRIGL SERPL-MCNC: 228 MG/DL (ref 0–150)
TSH SERPL DL<=0.05 MIU/L-ACNC: 3.35 UIU/ML (ref 0.27–4.2)
VLDLC SERPL-MCNC: 43 MG/DL (ref 5–40)
WBC NRBC COR # BLD AUTO: 8.09 10*3/MM3 (ref 3.4–10.8)

## 2025-05-02 PROCEDURE — 82570 ASSAY OF URINE CREATININE: CPT | Performed by: NURSE PRACTITIONER

## 2025-05-02 PROCEDURE — 86803 HEPATITIS C AB TEST: CPT | Performed by: NURSE PRACTITIONER

## 2025-05-02 PROCEDURE — 80050 GENERAL HEALTH PANEL: CPT | Performed by: NURSE PRACTITIONER

## 2025-05-02 PROCEDURE — G0103 PSA SCREENING: HCPCS | Performed by: NURSE PRACTITIONER

## 2025-05-02 PROCEDURE — 82043 UR ALBUMIN QUANTITATIVE: CPT | Performed by: NURSE PRACTITIONER

## 2025-05-02 PROCEDURE — 83036 HEMOGLOBIN GLYCOSYLATED A1C: CPT | Performed by: NURSE PRACTITIONER

## 2025-05-02 PROCEDURE — 80061 LIPID PANEL: CPT | Performed by: NURSE PRACTITIONER

## 2025-05-02 PROCEDURE — 36415 COLL VENOUS BLD VENIPUNCTURE: CPT | Performed by: NURSE PRACTITIONER

## 2025-05-02 NOTE — PROGRESS NOTES
Office Note     Name: John Wolfe    : 1965     MRN: [id number]     Chief Complaint  Follow-up    Subjective     History of Present Illness:  John Wolfe is a 59 y.o. male who presents today for an annual examination.   History of Present Illness  The patient presents for evaluation of bilateral foot pain, hypertension, diabetes mellitus, and health maintenance.    He reports experiencing pain, burning, and stinging sensations in two specific areas on his feet just below his small toes, which are exacerbated by walking or standing. The discomfort is temporarily alleviated by buffing down calluses. Various footwear, including wider and narrower shoes and different insoles, have been tried without significant improvement. Steel toe boots are worn at work, and no numbness in the toes is experienced. A history of plantar fasciitis was managed with exercises and stretching. Diflucan was previously prescribed for a toenail fungus, resulting in a false positive reading for hepatitis C antibodies.    Diabetes was diagnosed in , during which he weighed 316 pounds. Subsequent weight loss to 220 pounds through walking and dietary changes led to normalization of his blood glucose. However, some weight has been regained, and his glucose has elevated. Currently, Ozempic 0.5 mg is taken once weekly, with no recent weight loss reported. Insulin therapy is not currently used. Urination occurs once an hour at night, attributed to water intake during night shifts at work. No chest pain, shortness of breath, respiratory symptoms, irregular heartbeat, abdominal symptoms, urinary symptoms, genital issues, joint issues, skin issues, neurological changes, syncope, or seizures are reported. Seasonal allergies are present, but no food allergies or autoimmune disorders. No recent unusual weight gain or loss, fever, sweats, chills, or easy bruising are reported. Normal activities are maintained, and no mood changes are  noted. The last eye exam was conducted in 2022.    A history of fatty liver and an abnormal Cologuard test is noted, but a colonoscopy has not yet been undergone. Routine blood work is due, and coffee with creamer was consumed today so he is not fasting. No changes in diet are reported. The patient reports he is trying to use pink salt and lemon juice to improve his health.     Elevated blood pressure readings are reported despite taking medication. Losartan 320 mg is currently prescribed.    Review of Systems:   Review of Systems   Constitutional: Negative.    HENT: Negative.     Eyes: Negative.    Respiratory: Negative.     Cardiovascular:  Positive for leg swelling (chronic ankle swelling).   Gastrointestinal: Negative.    Genitourinary: Negative.    Musculoskeletal: Negative.    Skin: Negative.    Allergic/Immunologic: Positive for environmental allergies. Negative for food allergies and immunocompromised state.   Neurological: Negative.    Hematological: Negative.    Psychiatric/Behavioral: Negative.         Past Medical History:   Past Medical History:   Diagnosis Date    Aortic valve stenosis     Diabetes mellitus     GERD (gastroesophageal reflux disease)     Heart murmur     Hyperlipidemia     Hypertension     Stroke        Past Surgical History:   Past Surgical History:   Procedure Laterality Date    AORTIC VALVE REPAIR/REPLACEMENT N/A 2/23/2023    Procedure: TRANSCATHETER AORTIC VALVE REPLACEMENT,TEODORO;  Surgeon: Clemente Barger MD;  Location: [facility];  Service: Cardiothoracic;  Laterality: N/A;  FLOURO  DOSE  CONTRAST    AORTIC VALVE REPAIR/REPLACEMENT N/A 2/23/2023    Procedure: Transfemoral Transcatheter Aortic Valve Replacement;  Surgeon: Edwin Guan MD;  Location: [facility];  Service: Cardiovascular;  Laterality: N/A;    CARDIAC CATHETERIZATION N/A 01/26/2023    Procedure: Left Heart Cath;  Surgeon: Edwin Guan MD;  Location: [facility] CATH INVASIVE LOC[facility];   Service: Cardiology;  Laterality: N/A;    CHOLECYSTECTOMY      NASAL SEPTAL RECONSTRUCTION         Immunizations:   Immunization History   Administered Date(s) Administered    Influenza Seasonal Injectable 10/01/2019    Pneumococcal Conjugate 20-Valent (PCV20) 05/23/2024        Medications:     Current Outpatient Medications:     aspirin 81 MG chewable tablet, Chew 1 tablet Daily., Disp: 30 tablet, Rfl: 0    ezetimibe (Zetia) 10 MG tablet, Take 1 tablet by mouth Daily., Disp: 30 tablet, Rfl: 1    fluticasone (FLONASE) 50 MCG/ACT nasal spray, Administer 2 sprays into the nostril(s) as directed by provider Daily., Disp: 11.1 g, Rfl: 2    loratadine (CLARITIN) 10 MG tablet, Take 1 tablet by mouth once daily, Disp: 90 tablet, Rfl: 3    metoprolol succinate XL (TOPROL-XL) 50 MG 24 hr tablet, Take 1 tablet by mouth Daily., Disp: , Rfl:     Ozempic, 0.25 or 0.5 MG/DOSE, 2 MG/3ML solution pen-injector, INJECT 0.5 MG SUBCUTANEOUSLY INTO THE APPROPRIATE AREA ONCE A WEEK AS DIRECTED, Disp: 3 mL, Rfl: 0    rivaroxaban (Xarelto) 20 MG tablet, Take 1 tablet by mouth Daily With Dinner., Disp: 90 tablet, Rfl: 3    Sod Picosulfate-Mag Ox-Cit Acd 10-3.5-12 MG-GM -GM/160ML solution, Take 350 mL by mouth Take As Directed for 1 dose. (Patient not taking: Reported on 2/28/2025), Disp: 350 mL, Rfl: 0    sodium-potassium-magnesium sulfates (Suprep Bowel Prep Kit) 17.5-3.13-1.6 GM/177ML solution oral solution, Take 1 bottle by mouth Take As Directed., Disp: 354 mL, Rfl: 0    valsartan (DIOVAN) 320 MG tablet, Take 1 tablet by mouth Daily., Disp: 90 tablet, Rfl: 1    Ventolin  (90 Base) MCG/ACT inhaler, , Disp: , Rfl:   No current facility-administered medications for this visit.    Facility-Administered Medications Ordered in Other Visits:     Chlorhexidine Gluconate Cloth 2 % pads 1 application, 1 application , Topical, Q12H PRN, [place], Maura Lela, APRN    Allergies:   Allergies   Allergen Reactions    Ceclor [Cefaclor] Rash     "Lidocaine Hcl-Sodium Chloride Hives       Family History:   Family History   Problem Relation Age of Onset    Hypertension Mother     Hyperlipidemia Mother     Esophageal cancer Mother     Stroke Father     Hypertension Father     Hyperlipidemia Father        Social History:   Social History     Socioeconomic History    Marital status: Significant Other    Number of children: 1   Tobacco Use    Smoking status: Every Day     Current packs/day: 1.00     Average packs/day: 1 pack/day for 41.3 years (41.3 ttl pk-yrs)     Types: Cigarettes     Start date: 1984     Passive exposure: Current    Smokeless tobacco: Current     Types: Snuff   Vaping Use    Vaping status: Some Days    Substances: Nicotine, Flavoring    Devices: Refillable tank    Passive vaping exposure: Yes   Substance and Sexual Activity    Alcohol use: Yes     Comment: rarely    Drug use: Never    Sexual activity: Yes     Partners: Female         Objective     Vital Signs  /94 (BP Location: Right arm, Patient Position: Sitting, Cuff Size: Large Adult)   Pulse 85   Temp 98 °F (36.7 °C) (Temporal)   Resp 15   Ht 177.8 cm (70\")   Wt 134 kg (294 lb 12.8 oz)   SpO2 96%   BMI 42.30 kg/m²   Estimated body mass index is 42.3 kg/m² as calculated from the following:    Height as of this encounter: 177.8 cm (70\").    Weight as of this encounter: 134 kg (294 lb 12.8 oz).        Physical Exam  Vitals and nursing note reviewed.   Constitutional:       General: He is not in acute distress.     Appearance: Normal appearance. He is not ill-appearing or toxic-appearing.   HENT:      Head: Normocephalic and atraumatic.   Eyes:      General: No scleral icterus.        Right eye: No discharge.         Left eye: No discharge.      Conjunctiva/sclera: Conjunctivae normal.   Cardiovascular:      Rate and Rhythm: Normal rate and regular rhythm.      Pulses:           Dorsalis pedis pulses are 2+ on the right side and 2+ on the left side.      Heart sounds: Normal heart " sounds.   Pulmonary:      Effort: Pulmonary effort is normal.      Breath sounds: Normal breath sounds.   Musculoskeletal:      Cervical back: Normal range of motion and neck supple. No rigidity.      Right foot: Normal range of motion. No Charcot foot, foot drop or prominent metatarsal heads.      Left foot: Normal range of motion. No Charcot foot, foot drop or prominent metatarsal heads.        Feet:    Feet:      Right foot:      Protective Sensation: 10 sites tested.        Skin integrity: Skin integrity normal. Callus and dry skin present. No ulcer, blister, skin breakdown, erythema, warmth or fissure.      Toenail Condition: Right toenails are abnormally thick. Fungal disease present.     Left foot:      Protective Sensation: 10 sites tested.        Skin integrity: Skin integrity normal. Callus and dry skin present. No ulcer, blister, skin breakdown, erythema, warmth or fissure.      Toenail Condition: Left toenails are abnormally thick. Fungal disease present.     Comments: Patient reports pain at the outer forefoot on both feet when walking consistent with callused areas on the feet.The patient has diminished sensation on toes.   Skin:     General: Skin is warm.   Neurological:      Mental Status: He is alert. Mental status is at baseline.   Psychiatric:         Mood and Affect: Mood normal.         Behavior: Behavior normal.         Thought Content: Thought content normal.         Judgment: Judgment normal.          Assessment and Plan     Procedures      Lab Results (last 72 hours)       Procedure Component Value Units Date/Time    Lipid panel [[id number]] Collected: 05/02/25 1125    Specimen: Blood from Arm, Right Updated: 05/02/25 1125    CBC No Differential [[id number]] Collected: 05/02/25 1125    Specimen: Blood from Arm, Right Updated: 05/02/25 1125    Comprehensive Metabolic Panel [[id number]] Collected: 05/02/25 1125    Specimen: Blood from Arm, Right Updated: 05/02/25 1125    Hemoglobin A1c  [[id number]] Collected: 05/02/25 1125    Specimen: Blood from Arm, Right Updated: 05/02/25 1125    TSH [[id number]] Collected: 05/02/25 1125    Specimen: Blood from Arm, Right Updated: 05/02/25 1125    PSA Screen [[id number]] Collected: 05/02/25 1125    Specimen: Blood from Arm, Right Updated: 05/02/25 1125    Hepatitis C Antibody [[id number]] Collected: 05/02/25 1125    Specimen: Blood from Arm, Right Updated: 05/02/25 1125    Microalbumin / Creatinine Urine Ratio - Urine, Clean Catch [[id number]] Collected: 05/02/25 1126    Specimen: Urine, Clean Catch Updated: 05/02/25 1126              Diagnoses and all orders for this visit:    1. Annual physical exam (Primary)  -     Lipid panel; Future  -     CBC No Differential; Future  -     Comprehensive Metabolic Panel; Future  -     TSH; Future    2. Type 2 diabetes mellitus with hyperglycemia, without long-term current use of insulin  -     Cancel: POC Glycosylated Hemoglobin (Hb A1C); Future  -     Microalbumin / Creatinine Urine Ratio - Urine, Clean Catch; Future  -     Hemoglobin A1c; Future    3. Prostate cancer screening  -     PSA Screen; Future    4. Need for hepatitis C screening test  -     Hepatitis C Antibody; Future    5. Encounter for diabetic foot exam  Comments:  Discussed order for diabetic shoes. Patient declines at this time but will visit the foot store to be fitted for gel insoles due to foot pain.        Assessment & Plan  1. Bilateral foot pain.  - The bilateral foot pain could be attributed to his gait or footwear. He wears steel toe boots at work.  - Physical exam revealed soreness in the bilateral forefoot just below the little toes on both feet, with good pedal pulses.  - Advised to consider using gel inserts in his shoes and to consult a podiatrist if necessary.  - Encouraged to maintain regular foot care. Patient declines podiatry referral at this time. He will soak his feet in epsom salt to treat callused areas.     2. Hypertension.  - Blood  pressure is slightly elevated at 132/94.  - Advised to monitor salt intake and consider using a salt substitute.  - Continues to take losartan as prescribed.  - Discussed the potential impact of dietary changes, including the adding salt to foods, can elevated his blood pressure.    3. Diabetes Mellitus.  - Continues to take Ozempic 0.5 mg once a week.  - Weight has decreased from 299 pounds to 294 pounds since 02/28/2025.  - An A1c test will be conducted today to monitor blood sugar levels.  - Advised to get a dilated eye exam once a year due to diabetes.    4. Health Maintenance.  - Encouraged to keep the appointment with the gastroenterologist due to history of fatty liver and an abnormal Cologuard test. Discussed a positive Cologuard test can indicate colon cancer.   - Colonoscopy was recommended asap.Patient agrees to contact Dr. Mortensen, Gastroenterologist to reschedule his appointment.  - Routine blood work will be conducted today.  - A one-time screening for hepatitis C will also be performed.    Follow-up  The patient will follow up in 3 months.      Follow Up  No follow-ups on file.    Patient or patient representative verbalized consent for the use of Ambient Listening during the visit with  KYLAH Rey for chart documentation. 5/2/2025  16:06 EDT    KYLAH Rey [facility]  [address] [place] [address] DR CHELLE ABBASI [id number] [phone]

## 2025-05-04 DIAGNOSIS — E11.65 TYPE 2 DIABETES MELLITUS WITH HYPERGLYCEMIA, WITHOUT LONG-TERM CURRENT USE OF INSULIN: ICD-10-CM

## 2025-05-04 RX ORDER — SEMAGLUTIDE 0.68 MG/ML
INJECTION, SOLUTION SUBCUTANEOUS
Qty: 3 ML | Refills: 0 | Status: SHIPPED | OUTPATIENT
Start: 2025-05-04

## 2025-05-19 DIAGNOSIS — R74.8 ELEVATED LIVER ENZYMES: Primary | ICD-10-CM

## 2025-05-25 DIAGNOSIS — R74.8 ELEVATED LIVER ENZYMES: Primary | ICD-10-CM

## 2025-05-30 DIAGNOSIS — I48.0 PAROXYSMAL A-FIB: ICD-10-CM

## 2025-05-30 NOTE — TELEPHONE ENCOUNTER
Rx Refill Note  Requested Prescriptions     Pending Prescriptions Disp Refills    rivaroxaban (Xarelto) 20 MG tablet 90 tablet 3     Sig: Take 1 tablet by mouth Daily With Dinner.      Last office visit with prescribing clinician: 2/28/2025   Last telemedicine visit with prescribing clinician: Visit date not found   Next office visit with prescribing clinician: 2/27/2026   Garima Figueroa MA  05/30/25, 11:11 EDT

## 2025-05-31 DIAGNOSIS — E11.65 TYPE 2 DIABETES MELLITUS WITH HYPERGLYCEMIA, WITHOUT LONG-TERM CURRENT USE OF INSULIN: ICD-10-CM

## 2025-06-01 RX ORDER — SEMAGLUTIDE 0.68 MG/ML
INJECTION, SOLUTION SUBCUTANEOUS
Qty: 3 ML | Refills: 2 | Status: SHIPPED | OUTPATIENT
Start: 2025-06-01

## 2025-08-18 DIAGNOSIS — E11.65 TYPE 2 DIABETES MELLITUS WITH HYPERGLYCEMIA, WITHOUT LONG-TERM CURRENT USE OF INSULIN: ICD-10-CM

## 2025-08-18 RX ORDER — SEMAGLUTIDE 0.68 MG/ML
0.5 INJECTION, SOLUTION SUBCUTANEOUS WEEKLY
Qty: 3 ML | Refills: 0 | Status: SHIPPED | OUTPATIENT
Start: 2025-08-18

## (undated) DEVICE — SUT PROLN 4/0 BB D/A 36IN 8581H

## (undated) DEVICE — DRAPE,TOP,102X53,STERILE: Brand: MEDLINE

## (undated) DEVICE — PK PERFUS CUST W/CARDIOPLEGIA

## (undated) DEVICE — NDL PERC 1PRT THNWALL W/BASEPLT 18G 7CM

## (undated) DEVICE — PATIENT RETURN ELECTRODE, SINGLE-USE, CONTACT QUALITY MONITORING, ADULT, WITH 9FT CORD, FOR PATIENTS WEIGING OVER 33LBS. (15KG): Brand: MEGADYNE

## (undated) DEVICE — GLIDEX™ COATED HYDROPHILIC GUIDEWIRE: Brand: MAGIC TORQUE™

## (undated) DEVICE — ADULT, W/LG. BACK PAD, RADIOTRANSPARENT ELEMENT AND LEAD WIRE: Brand: DEFIBRILLATION ELECTRODES

## (undated) DEVICE — 3M™ STERI-DRAPE™ INSTRUMENT POUCH 1018: Brand: STERI-DRAPE™

## (undated) DEVICE — SUCTION CANISTER 2500CC: Brand: DEROYAL

## (undated) DEVICE — ANTIBACTERIAL UNDYED BRAIDED (POLYGLACTIN 910), SYNTHETIC ABSORBABLE SUTURE: Brand: COATED VICRYL

## (undated) DEVICE — SLV REPOSTNG CATH STRL 60CM

## (undated) DEVICE — SI AVANTI+ 7F STD W/GW  NO OBT: Brand: AVANTI

## (undated) DEVICE — COVER,TABLE,HVY DUTY,60"X90",STRL: Brand: MEDLINE

## (undated) DEVICE — CATH DIAG EXPO M/ PK 5F FL4/FR4 PIG

## (undated) DEVICE — PK CATH CARD 10

## (undated) DEVICE — GW SAFARI2 PRESH XSM CRV .035IN 3.2X2.9X275CM

## (undated) DEVICE — SUT PROLN 7/0 BV1 24IN 4PK M8702

## (undated) DEVICE — ADHS SKIN PREMIERPRO EXOFIN TOPICAL HI/VISC .5ML

## (undated) DEVICE — PROVIDES A STERILE INTERFACE BETWEEN THE OPERATING ROOM SURGICAL LAMPS (NON-STERILE) AND THE SURGEON OR NURSE (STERILE).: Brand: STERION®CLAMP COVER FABRIC

## (undated) DEVICE — PINNACLE INTRODUCER SHEATH: Brand: PINNACLE

## (undated) DEVICE — GW INQWIRE FC PTFE STD J/1.5 .035 260

## (undated) DEVICE — SENSR CERBRL O2 PK/2

## (undated) DEVICE — CATH DIAG EXPO .056 AL1 6F 100CM

## (undated) DEVICE — APPL CHLORAPREP TINTED 26ML TEAL

## (undated) DEVICE — RADIFOCUS GLIDEWIRE: Brand: GLIDEWIRE

## (undated) DEVICE — 3M™ IOBAN™ 2 ANTIMICROBIAL INCISE DRAPE 6651EZ: Brand: IOBAN™ 2

## (undated) DEVICE — CATH DIAG EXPO .045 FL3.5 5F 100CM

## (undated) DEVICE — SHEET, DRAPE, SPLIT, STERILE: Brand: MEDLINE

## (undated) DEVICE — INTENDED FOR TISSUE SEPARATION, AND OTHER PROCEDURES THAT REQUIRE A SHARP SURGICAL BLADE TO PUNCTURE OR CUT.: Brand: BARD-PARKER ® STAINLESS STEEL BLADES

## (undated) DEVICE — GW AMPLTZ SUPERSTIFF STR .035IN 180CM

## (undated) DEVICE — CATH GUIDE BERN 5F 65CM

## (undated) DEVICE — COVER,MAYO STAND,XL,STERILE: Brand: MEDLINE

## (undated) DEVICE — CATH PACE PACEL BIPOL 5F110CM

## (undated) DEVICE — BLANKT WARM UNDER/BDY A/ 100X195CM DISP LF

## (undated) DEVICE — ANGIOGRAPHIC CATHETER: Brand: EXPO™

## (undated) DEVICE — PERCLOSE™ PROSTYLE™ SUTURE-MEDIATED CLOSURE AND REPAIR SYSTEM: Brand: PERCLOSE™ PROSTYLE™

## (undated) DEVICE — BOWL UTIL STRL 32OZ

## (undated) DEVICE — PK ATS CUST W CARDIOTOMY RESEVOIR

## (undated) DEVICE — DEV COMP RAD PRELUDESYNC 24CM

## (undated) DEVICE — SUT SILK 2/0 TIES 18IN A185H

## (undated) DEVICE — GW CERBRL FC PTFE STD/STR .035 260CM

## (undated) DEVICE — SUT MONOCRYL PLS ANTIB UND 3/0  PS1 27IN

## (undated) DEVICE — HDRST POSTIN FM CRDL TRACH SLOT 9X8X4IN

## (undated) DEVICE — AIRLIFE™ UNIVERSAL OXYGEN NUT AND NIPPLE CONNECTION: Brand: AIRLIFE™

## (undated) DEVICE — TRAP FLD MINIVAC MEGADYNE 100ML

## (undated) DEVICE — SUT SILK 3/0 TIES 18IN A184H

## (undated) DEVICE — SYR LUERLOK 50ML

## (undated) DEVICE — GLV SURG PREMIERPRO MIC LTX PF SZ6.5 BRN

## (undated) DEVICE — INTRO SHEATH PRELUDE IDEAL SPRNG COIL 021 6F 23X80CM

## (undated) DEVICE — LHK- LEFT HEART KIT BAPTIST: Brand: MEDLINE INDUSTRIES, INC.

## (undated) DEVICE — 3M™ STERI-DRAPE™ FLUOROSCOPE DRAPE, 10 PER CARTON / 4 CARTONS PER CASE, 1012: Brand: STERI-DRAPE™

## (undated) DEVICE — SYR LUERLOK 30CC

## (undated) DEVICE — PK MINOR SPLT 10

## (undated) DEVICE — DECANTER BAG 9": Brand: MEDLINE INDUSTRIES, INC.

## (undated) DEVICE — SUT SILK 4/0 TIES 18IN A183H

## (undated) DEVICE — TBG PENCL TELESCP MEGADYNE SMOKE EVAC 10FT

## (undated) DEVICE — ELECTRD DEFIB M/FUNC PROPADZ STRL 2PK

## (undated) DEVICE — SUT PROLN 6/0 C1 D/A 30IN 8706H

## (undated) DEVICE — SUT SILK 0 SH 30IN K834H

## (undated) DEVICE — PAD ARMBRD SURG CONVOL 7.5X20X2IN

## (undated) DEVICE — BOOT SUT XRAY DETECT STD YEL/BLU CA/50

## (undated) DEVICE — GW PERIPH VASC ADX J/TP SS .035 150CM 3MM

## (undated) DEVICE — PENCL ROCKRSWCH MEGADYNE W/HOLSTR 10FT SS

## (undated) DEVICE — KT MANIFOLD CATHLAB CUST

## (undated) DEVICE — CLTH CLENS READYCLEANSE PERI CARE PK/5

## (undated) DEVICE — CABL PACE ATRIAL PT BLU

## (undated) DEVICE — ELECTRD BLD EZ CLN STD 2.5IN